# Patient Record
Sex: FEMALE | Race: ASIAN | NOT HISPANIC OR LATINO
[De-identification: names, ages, dates, MRNs, and addresses within clinical notes are randomized per-mention and may not be internally consistent; named-entity substitution may affect disease eponyms.]

---

## 2017-02-17 ENCOUNTER — LABORATORY RESULT (OUTPATIENT)
Age: 32
End: 2017-02-17

## 2017-02-17 ENCOUNTER — APPOINTMENT (OUTPATIENT)
Dept: GASTROENTEROLOGY | Facility: CLINIC | Age: 32
End: 2017-02-17

## 2017-02-17 VITALS
HEIGHT: 65 IN | OXYGEN SATURATION: 96 % | SYSTOLIC BLOOD PRESSURE: 100 MMHG | HEART RATE: 97 BPM | WEIGHT: 98 LBS | TEMPERATURE: 98.2 F | DIASTOLIC BLOOD PRESSURE: 60 MMHG | RESPIRATION RATE: 16 BRPM | BODY MASS INDEX: 16.33 KG/M2

## 2017-02-17 DIAGNOSIS — Z80.0 FAMILY HISTORY OF MALIGNANT NEOPLASM OF DIGESTIVE ORGANS: ICD-10-CM

## 2017-02-21 LAB
25(OH)D3 SERPL-MCNC: 59.3 NG/ML
ALBUMIN SERPL ELPH-MCNC: 4.6 G/DL
ALP BLD-CCNC: 54 U/L
ALT SERPL-CCNC: 16 U/L
ANION GAP SERPL CALC-SCNC: 16 MMOL/L
AST SERPL-CCNC: 26 U/L
BASOPHILS # BLD AUTO: 0.01 K/UL
BASOPHILS NFR BLD AUTO: 0.2 %
BILIRUB SERPL-MCNC: 0.5 MG/DL
BUN SERPL-MCNC: 9 MG/DL
CALCIUM SERPL-MCNC: 9.7 MG/DL
CHLORIDE SERPL-SCNC: 98 MMOL/L
CO2 SERPL-SCNC: 24 MMOL/L
CREAT SERPL-MCNC: 0.94 MG/DL
CRP SERPL-MCNC: <0.2 MG/DL
EOSINOPHIL # BLD AUTO: 0.24 K/UL
EOSINOPHIL NFR BLD AUTO: 4.8 %
GLUCOSE SERPL-MCNC: 84 MG/DL
HCT VFR BLD CALC: 42.2 %
HGB BLD-MCNC: 13.5 G/DL
IGA SER QL IEP: 370 MG/DL
IMM GRANULOCYTES NFR BLD AUTO: 0 %
INR PPP: 1.03 RATIO
LYMPHOCYTES # BLD AUTO: 1.9 K/UL
LYMPHOCYTES NFR BLD AUTO: 37.7 %
MAN DIFF?: NORMAL
MCHC RBC-ENTMCNC: 28.7 PG
MCHC RBC-ENTMCNC: 32 GM/DL
MCV RBC AUTO: 89.6 FL
MONOCYTES # BLD AUTO: 0.32 K/UL
MONOCYTES NFR BLD AUTO: 6.3 %
NEUTROPHILS # BLD AUTO: 2.57 K/UL
NEUTROPHILS NFR BLD AUTO: 51 %
PLATELET # BLD AUTO: 306 K/UL
POTASSIUM SERPL-SCNC: 4.2 MMOL/L
PREALB SERPL NEPH-MCNC: 23 MG/DL
PROT SERPL-MCNC: 8.3 G/DL
PT BLD: 11.6 SEC
RBC # BLD: 4.71 M/UL
RBC # FLD: 13.1 %
SODIUM SERPL-SCNC: 138 MMOL/L
T3FREE SERPL-MCNC: 2.96 PG/ML
T4 FREE SERPL-MCNC: 1.3 NG/DL
TRYPSINOGEN SERPL-MCNC: 452 NG/ML
TSH SERPL-ACNC: 4.35 UIU/ML
TTG IGA SER IA-ACNC: 9.2 UNITS
TTG IGA SER-ACNC: NEGATIVE
VIT A SERPL-MCNC: 43 UG/DL
VIT B12 SERPL-MCNC: 1138 PG/ML
WBC # FLD AUTO: 5.04 K/UL
ZINC SERPL-MCNC: 96 UG/DL

## 2017-02-24 LAB
A-TOCOPHEROL VIT E SERPL-MCNC: 17.8 MG/L
BETA+GAMMA TOCOPHEROL SERPL-MCNC: <1 MG/L

## 2017-03-04 ENCOUNTER — APPOINTMENT (OUTPATIENT)
Dept: ULTRASOUND IMAGING | Facility: IMAGING CENTER | Age: 32
End: 2017-03-04

## 2017-03-07 ENCOUNTER — NON-APPOINTMENT (OUTPATIENT)
Age: 32
End: 2017-03-07

## 2017-03-07 ENCOUNTER — APPOINTMENT (OUTPATIENT)
Dept: INTERNAL MEDICINE | Facility: CLINIC | Age: 32
End: 2017-03-07

## 2017-03-07 VITALS — BODY MASS INDEX: 17.54 KG/M2 | WEIGHT: 99 LBS | HEIGHT: 63 IN

## 2017-03-07 VITALS — HEART RATE: 94 BPM | DIASTOLIC BLOOD PRESSURE: 69 MMHG | SYSTOLIC BLOOD PRESSURE: 101 MMHG

## 2017-03-13 LAB
ALBUMIN SERPL ELPH-MCNC: 4.6 G/DL
ALP BLD-CCNC: 53 U/L
ALT SERPL-CCNC: 16 U/L
ANION GAP SERPL CALC-SCNC: 20 MMOL/L
AST SERPL-CCNC: 25 U/L
BASOPHILS # BLD AUTO: 0.03 K/UL
BASOPHILS NFR BLD AUTO: 0.4 %
BILIRUB SERPL-MCNC: 0.5 MG/DL
BUN SERPL-MCNC: 8 MG/DL
CALCIUM SERPL-MCNC: 10.1 MG/DL
CHLORIDE SERPL-SCNC: 97 MMOL/L
CHOLEST SERPL-MCNC: 179 MG/DL
CHOLEST/HDLC SERPL: 2.9 RATIO
CO2 SERPL-SCNC: 19 MMOL/L
CREAT SERPL-MCNC: 0.75 MG/DL
EOSINOPHIL # BLD AUTO: 0.2 K/UL
EOSINOPHIL NFR BLD AUTO: 3 %
GLUCOSE SERPL-MCNC: 110 MG/DL
HBA1C MFR BLD HPLC: 5.5 %
HCT VFR BLD CALC: 42.2 %
HDLC SERPL-MCNC: 62 MG/DL
HGB BLD-MCNC: 13.5 G/DL
IMM GRANULOCYTES NFR BLD AUTO: 0.1 %
LDLC SERPL CALC-MCNC: 89 MG/DL
LYMPHOCYTES # BLD AUTO: 2.78 K/UL
LYMPHOCYTES NFR BLD AUTO: 41.2 %
MAN DIFF?: NORMAL
MCHC RBC-ENTMCNC: 29 PG
MCHC RBC-ENTMCNC: 32 GM/DL
MCV RBC AUTO: 90.8 FL
MONOCYTES # BLD AUTO: 0.48 K/UL
MONOCYTES NFR BLD AUTO: 7.1 %
NEUTROPHILS # BLD AUTO: 3.24 K/UL
NEUTROPHILS NFR BLD AUTO: 48.2 %
PLATELET # BLD AUTO: 338 K/UL
POTASSIUM SERPL-SCNC: 4.9 MMOL/L
PROT SERPL-MCNC: 7.9 G/DL
RBC # BLD: 4.65 M/UL
RBC # FLD: 13.4 %
SAVE SPECIMEN: NORMAL
SODIUM SERPL-SCNC: 136 MMOL/L
T3FREE SERPL-MCNC: 3.42 PG/ML
T4 SERPL-MCNC: 8.4 UG/DL
THYROGLOB AB SERPL-ACNC: <20 IU/ML
THYROPEROXIDASE AB SERPL IA-ACNC: <10 IU/ML
TRIGL SERPL-MCNC: 138 MG/DL
TSH SERPL-ACNC: 4.62 UIU/ML
WBC # FLD AUTO: 6.74 K/UL

## 2017-12-11 ENCOUNTER — EMERGENCY (EMERGENCY)
Facility: HOSPITAL | Age: 32
LOS: 1 days | Discharge: HOME | End: 2017-12-11
Admitting: EMERGENCY MEDICINE
Payer: SELF-PAY

## 2017-12-11 VITALS
OXYGEN SATURATION: 100 % | SYSTOLIC BLOOD PRESSURE: 139 MMHG | DIASTOLIC BLOOD PRESSURE: 90 MMHG | TEMPERATURE: 98 F | RESPIRATION RATE: 17 BRPM | HEART RATE: 98 BPM

## 2017-12-11 PROCEDURE — 99284 EMERGENCY DEPT VISIT MOD MDM: CPT | Mod: 25

## 2017-12-11 RX ORDER — KETOROLAC TROMETHAMINE 30 MG/ML
15 SYRINGE (ML) INJECTION ONCE
Refills: 0 | Status: DISCONTINUED | OUTPATIENT
Start: 2017-12-11 | End: 2017-12-11

## 2017-12-11 RX ORDER — SODIUM CHLORIDE 9 MG/ML
1000 INJECTION INTRAMUSCULAR; INTRAVENOUS; SUBCUTANEOUS ONCE
Refills: 0 | Status: COMPLETED | OUTPATIENT
Start: 2017-12-11 | End: 2017-12-11

## 2017-12-11 RX ORDER — ONDANSETRON 8 MG/1
4 TABLET, FILM COATED ORAL ONCE
Refills: 0 | Status: COMPLETED | OUTPATIENT
Start: 2017-12-11 | End: 2017-12-11

## 2017-12-11 NOTE — ED PROVIDER NOTE - OBJECTIVE STATEMENT
33 yo F no pmhx here for HA, cough, congestion, body aches x 1 week. pt reports over the past week she has had intermittent HA associated with cough, congestion, and body aches. Reports HA similar in past, took advil two days ago with improvement however HA returned today. States STRONG comes and goes always starting slow and getting worse, all over head, throbbing, never wakes from sleep, never wakes up with headache. Reports mild cough and nasal congestion. Also notes mild nausea in ED. Denies fever chills vomiting diarrhea cp sob weakness numbness tingling dizziness syncope abdominal pain. 33 yo F no pmhx here for HA, cough, congestion, body aches x 1 week. pt reports over the past week she has had intermittent HA associated with cough, congestion, and body aches. Reports HA similar in past, took advil two days ago with improvement however HA returned today. States STRONG comes and goes always starting slow and getting worse, all over head, throbbing, never wakes from sleep, never wakes up with headache. Reports mild cough and nasal congestion. Also notes mild nausea in ED. Denies head trauma or LOC. Denies syncope. Denies fever chills vomiting diarrhea cp sob weakness numbness tingling dizziness syncope abdominal pain.

## 2017-12-11 NOTE — ED ADULT NURSE NOTE - OBJECTIVE STATEMENT
pt received intake 2, alert and orientedx3. c.o cough, congestion, body aches x 1 week with intermittent headaches. denies chest pain dizziness nausea vomiting sob. respirations even unlabored. 20G IV placed in right ac. labs sent. will monitor.

## 2017-12-11 NOTE — ED PROVIDER NOTE - MEDICAL DECISION MAKING DETAILS
31 yo F here for nasal congestion, cough, HA, body aches over past week. Reports HA improved since earlier, mild nausea. Otherwise without complaints. Well appearing VSS, no neuro deficit, no mening mus  Likely viral syndrome. PLAN: basic labs, HA cocktail, ucg, reassess 31 yo F here for nasal congestion, cough, HA, body aches over past week. Reports HA improved since earlier, mild nausea. Otherwise without complaints. Well appearing VSS, no neuro deficit, no mening mus  Likely viral syndrome no e/o or concern for intracranial pathology at this time. PLAN: basic labs, HA cocktail, ucg, reassess

## 2017-12-11 NOTE — ED PROVIDER NOTE - CHPI ED SYMPTOMS NEG
no numbness/no dizziness/no fever/no tingling/no weakness/no chills/no decreased eating/drinking/no vomiting

## 2017-12-11 NOTE — ED PROVIDER NOTE - PHYSICAL EXAMINATION
NEURO: EOMi, PERRLA, visual fields intact, tongue midline, negative romberg, strength 5/5 UE and LE bilaterally, normal gait, facial expressions intact, point to point intact, rapid alternating movements intact, sensate intact to UE and LE bilaterally. NVI

## 2017-12-11 NOTE — ED PROVIDER NOTE - CARE PLAN
Principal Discharge DX:	Headache Principal Discharge DX:	Headache  Instructions for follow-up, activity and diet:	Rest, and drink plenty of fluid. Follow up with PMD in 24-48 hours. To continue the evaluation of your headache, please follow up with a neurologist.  We will give you a list of neurologists (always check with your insurance to make sure the doctor is covered).  -- You can also contact the [facility] ([address] [place] [address], [address] in [place], [place].  Call [phone] for an appointment.    -- Return to the emergency department if you have a headache that seems different or much worse than your usual migraine headache, have a severe headache with a fever or a stiff neck, changes in you speech/vision/balance/movement, feel like you are going to faint, you become confused, or you have a seizure.

## 2017-12-11 NOTE — ED PROVIDER NOTE - PLAN OF CARE
Rest, and drink plenty of fluid. Follow up with PMD in 24-48 hours. To continue the evaluation of your headache, please follow up with a neurologist.  We will give you a list of neurologists (always check with your insurance to make sure the doctor is covered).  -- You can also contact the [facility] ([address] [place] [address], [address] in [place], [place].  Call [phone] for an appointment.    -- Return to the emergency department if you have a headache that seems different or much worse than your usual migraine headache, have a severe headache with a fever or a stiff neck, changes in you speech/vision/balance/movement, feel like you are going to faint, you become confused, or you have a seizure.

## 2017-12-12 LAB
ALBUMIN SERPL ELPH-MCNC: 4.3 G/DL — SIGNIFICANT CHANGE UP (ref 3.3–5)
ALP SERPL-CCNC: 44 U/L — SIGNIFICANT CHANGE UP (ref 40–120)
ALT FLD-CCNC: 13 U/L — SIGNIFICANT CHANGE UP (ref 4–33)
AST SERPL-CCNC: 20 U/L — SIGNIFICANT CHANGE UP (ref 4–32)
BILIRUB SERPL-MCNC: 0.3 MG/DL — SIGNIFICANT CHANGE UP (ref 0.2–1.2)
BUN SERPL-MCNC: 12 MG/DL — SIGNIFICANT CHANGE UP (ref 7–23)
CALCIUM SERPL-MCNC: 8.9 MG/DL — SIGNIFICANT CHANGE UP (ref 8.4–10.5)
CHLORIDE SERPL-SCNC: 104 MMOL/L — SIGNIFICANT CHANGE UP (ref 98–107)
CO2 SERPL-SCNC: 25 MMOL/L — SIGNIFICANT CHANGE UP (ref 22–31)
CREAT SERPL-MCNC: 0.57 MG/DL — SIGNIFICANT CHANGE UP (ref 0.5–1.3)
GLUCOSE SERPL-MCNC: 101 MG/DL — HIGH (ref 70–99)
HCT VFR BLD CALC: 36 % — SIGNIFICANT CHANGE UP (ref 34.5–45)
HGB BLD-MCNC: 12 G/DL — SIGNIFICANT CHANGE UP (ref 11.5–15.5)
MCHC RBC-ENTMCNC: 29.7 PG — SIGNIFICANT CHANGE UP (ref 27–34)
MCHC RBC-ENTMCNC: 33.3 % — SIGNIFICANT CHANGE UP (ref 32–36)
MCV RBC AUTO: 89.1 FL — SIGNIFICANT CHANGE UP (ref 80–100)
NRBC # FLD: 0 — SIGNIFICANT CHANGE UP
PLATELET # BLD AUTO: 231 K/UL — SIGNIFICANT CHANGE UP (ref 150–400)
PMV BLD: 10.1 FL — SIGNIFICANT CHANGE UP (ref 7–13)
POTASSIUM SERPL-MCNC: 4.6 MMOL/L — SIGNIFICANT CHANGE UP (ref 3.5–5.3)
POTASSIUM SERPL-SCNC: 4.6 MMOL/L — SIGNIFICANT CHANGE UP (ref 3.5–5.3)
PROT SERPL-MCNC: 7.3 G/DL — SIGNIFICANT CHANGE UP (ref 6–8.3)
RBC # BLD: 4.04 M/UL — SIGNIFICANT CHANGE UP (ref 3.8–5.2)
RBC # FLD: 13.2 % — SIGNIFICANT CHANGE UP (ref 10.3–14.5)
SODIUM SERPL-SCNC: 141 MMOL/L — SIGNIFICANT CHANGE UP (ref 135–145)
WBC # BLD: 6.99 K/UL — SIGNIFICANT CHANGE UP (ref 3.8–10.5)
WBC # FLD AUTO: 6.99 K/UL — SIGNIFICANT CHANGE UP (ref 3.8–10.5)

## 2017-12-12 RX ADMIN — Medication 15 MILLIGRAM(S): at 00:19

## 2017-12-12 RX ADMIN — ONDANSETRON 4 MILLIGRAM(S): 8 TABLET, FILM COATED ORAL at 00:20

## 2017-12-12 RX ADMIN — SODIUM CHLORIDE 1000 MILLILITER(S): 9 INJECTION INTRAMUSCULAR; INTRAVENOUS; SUBCUTANEOUS at 00:20

## 2018-06-20 ENCOUNTER — EMERGENCY (EMERGENCY)
Facility: HOSPITAL | Age: 33
LOS: 1 days | Discharge: HOME | End: 2018-06-20
Attending: EMERGENCY MEDICINE | Admitting: EMERGENCY MEDICINE
Payer: SELF-PAY

## 2018-06-20 VITALS
OXYGEN SATURATION: 97 % | HEART RATE: 111 BPM | RESPIRATION RATE: 20 BRPM | DIASTOLIC BLOOD PRESSURE: 70 MMHG | TEMPERATURE: 99 F | SYSTOLIC BLOOD PRESSURE: 113 MMHG

## 2018-06-20 LAB
ALBUMIN SERPL ELPH-MCNC: 4.5 G/DL — SIGNIFICANT CHANGE UP (ref 3.3–5)
ALP SERPL-CCNC: 58 U/L — SIGNIFICANT CHANGE UP (ref 40–120)
ALT FLD-CCNC: 13 U/L — SIGNIFICANT CHANGE UP (ref 4–33)
APPEARANCE UR: SIGNIFICANT CHANGE UP
AST SERPL-CCNC: 20 U/L — SIGNIFICANT CHANGE UP (ref 4–32)
BACTERIA # UR AUTO: SIGNIFICANT CHANGE UP
BASOPHILS # BLD AUTO: 0.03 K/UL — SIGNIFICANT CHANGE UP (ref 0–0.2)
BASOPHILS NFR BLD AUTO: 0.3 % — SIGNIFICANT CHANGE UP (ref 0–2)
BILIRUB SERPL-MCNC: 0.3 MG/DL — SIGNIFICANT CHANGE UP (ref 0.2–1.2)
BILIRUB UR-MCNC: NEGATIVE — SIGNIFICANT CHANGE UP
BLOOD UR QL VISUAL: HIGH
BUN SERPL-MCNC: 6 MG/DL — LOW (ref 7–23)
CALCIUM SERPL-MCNC: 9.5 MG/DL — SIGNIFICANT CHANGE UP (ref 8.4–10.5)
CHLORIDE SERPL-SCNC: 99 MMOL/L — SIGNIFICANT CHANGE UP (ref 98–107)
CO2 SERPL-SCNC: 27 MMOL/L — SIGNIFICANT CHANGE UP (ref 22–31)
COLOR SPEC: YELLOW — SIGNIFICANT CHANGE UP
CREAT SERPL-MCNC: 0.7 MG/DL — SIGNIFICANT CHANGE UP (ref 0.5–1.3)
EOSINOPHIL # BLD AUTO: 0.1 K/UL — SIGNIFICANT CHANGE UP (ref 0–0.5)
EOSINOPHIL NFR BLD AUTO: 0.9 % — SIGNIFICANT CHANGE UP (ref 0–6)
GLUCOSE SERPL-MCNC: 97 MG/DL — SIGNIFICANT CHANGE UP (ref 70–99)
GLUCOSE UR-MCNC: NEGATIVE — SIGNIFICANT CHANGE UP
HCT VFR BLD CALC: 39.2 % — SIGNIFICANT CHANGE UP (ref 34.5–45)
HGB BLD-MCNC: 12.4 G/DL — SIGNIFICANT CHANGE UP (ref 11.5–15.5)
IMM GRANULOCYTES # BLD AUTO: 0.03 # — SIGNIFICANT CHANGE UP
IMM GRANULOCYTES NFR BLD AUTO: 0.3 % — SIGNIFICANT CHANGE UP (ref 0–1.5)
KETONES UR-MCNC: NEGATIVE — SIGNIFICANT CHANGE UP
LEUKOCYTE ESTERASE UR-ACNC: HIGH
LYMPHOCYTES # BLD AUTO: 19.3 % — SIGNIFICANT CHANGE UP (ref 13–44)
LYMPHOCYTES # BLD AUTO: 2.15 K/UL — SIGNIFICANT CHANGE UP (ref 1–3.3)
MCHC RBC-ENTMCNC: 28.4 PG — SIGNIFICANT CHANGE UP (ref 27–34)
MCHC RBC-ENTMCNC: 31.6 % — LOW (ref 32–36)
MCV RBC AUTO: 89.9 FL — SIGNIFICANT CHANGE UP (ref 80–100)
MONOCYTES # BLD AUTO: 0.66 K/UL — SIGNIFICANT CHANGE UP (ref 0–0.9)
MONOCYTES NFR BLD AUTO: 5.9 % — SIGNIFICANT CHANGE UP (ref 2–14)
MUCOUS THREADS # UR AUTO: SIGNIFICANT CHANGE UP
NEUTROPHILS # BLD AUTO: 8.16 K/UL — HIGH (ref 1.8–7.4)
NEUTROPHILS NFR BLD AUTO: 73.3 % — SIGNIFICANT CHANGE UP (ref 43–77)
NITRITE UR-MCNC: NEGATIVE — SIGNIFICANT CHANGE UP
NRBC # FLD: 0 — SIGNIFICANT CHANGE UP
PH UR: 7.5 — SIGNIFICANT CHANGE UP (ref 4.6–8)
PLATELET # BLD AUTO: 289 K/UL — SIGNIFICANT CHANGE UP (ref 150–400)
PMV BLD: 9.5 FL — SIGNIFICANT CHANGE UP (ref 7–13)
POTASSIUM SERPL-MCNC: 4.4 MMOL/L — SIGNIFICANT CHANGE UP (ref 3.5–5.3)
POTASSIUM SERPL-SCNC: 4.4 MMOL/L — SIGNIFICANT CHANGE UP (ref 3.5–5.3)
PROT SERPL-MCNC: 7.9 G/DL — SIGNIFICANT CHANGE UP (ref 6–8.3)
PROT UR-MCNC: 150 MG/DL — HIGH
RBC # BLD: 4.36 M/UL — SIGNIFICANT CHANGE UP (ref 3.8–5.2)
RBC # FLD: 13.6 % — SIGNIFICANT CHANGE UP (ref 10.3–14.5)
RBC CASTS # UR COMP ASSIST: >50 — HIGH (ref 0–?)
SODIUM SERPL-SCNC: 138 MMOL/L — SIGNIFICANT CHANGE UP (ref 135–145)
SP GR SPEC: 1.01 — SIGNIFICANT CHANGE UP (ref 1–1.04)
SQUAMOUS # UR AUTO: SIGNIFICANT CHANGE UP
TSH SERPL-MCNC: 5.13 UIU/ML — HIGH (ref 0.27–4.2)
UROBILINOGEN FLD QL: NORMAL MG/DL — SIGNIFICANT CHANGE UP
WBC # BLD: 11.13 K/UL — HIGH (ref 3.8–10.5)
WBC # FLD AUTO: 11.13 K/UL — HIGH (ref 3.8–10.5)
WBC UR QL: >50 — HIGH (ref 0–?)

## 2018-06-20 PROCEDURE — 99284 EMERGENCY DEPT VISIT MOD MDM: CPT

## 2018-06-20 RX ORDER — CEFTRIAXONE 500 MG/1
1 INJECTION, POWDER, FOR SOLUTION INTRAMUSCULAR; INTRAVENOUS EVERY 24 HOURS
Refills: 0 | Status: COMPLETED | OUTPATIENT
Start: 2018-06-20 | End: 2018-06-20

## 2018-06-20 RX ORDER — CEPHALEXIN 500 MG
1 CAPSULE ORAL
Qty: 30 | Refills: 0
Start: 2018-06-20 | End: 2018-06-29

## 2018-06-20 RX ADMIN — CEFTRIAXONE 100 GRAM(S): 500 INJECTION, POWDER, FOR SOLUTION INTRAMUSCULAR; INTRAVENOUS at 19:40

## 2018-06-20 NOTE — ED PROVIDER NOTE - PHYSICAL EXAMINATION
as below  +  skin appears loose over abd  c/w rteported wt loss as below  +  skin appears loose over abd  c/w reported wt loss

## 2018-06-20 NOTE — ED PROVIDER NOTE - MEDICAL DECISION MAKING DETAILS
exam suggests early pyelonephritis vs stone    wt loss  over 2 yrs  ?etiology    screening labs unremarkable  ordered CT to detect possible stone  pt refused did not wish to wait for formal imaging  bedside US  no hydro  pt will take po antibiotcs  encouraged to f/u with PCP

## 2018-06-20 NOTE — ED PROVIDER NOTE - OBJECTIVE STATEMENT
pt presents with one day of acute sxs  Began with painful urination with visible blood  today with franky in rt flank  no vomiting or recorded fever  no resp issues  notes intermittent blood in stools for 1 yr with episodes of both constipation and diarrhea  int epig pain and reflux with certain foods  Notes 100 lb wt loss over 2 yrs which she has attributed to stress but she does not elaborate  Pt has no PMD pt presents with one day of acute sxs  Began with painful urination with visible blood  today with pain rt flank  no vomiting or recorded fever  no resp issues  notes intermittent blood in stools for 1 yr with episodes of both constipation and diarrhea  int epig pain and reflux with certain foods  Notes 100 lb wt loss over 2 yrs which she has attributed to stress but she does not elaborate  Pt has no PMD

## 2018-06-21 LAB
HIV 1+2 AB+HIV1 P24 AG SERPL QL IA: SIGNIFICANT CHANGE UP
SPECIMEN SOURCE: SIGNIFICANT CHANGE UP

## 2018-06-22 LAB
-  AMIKACIN: SIGNIFICANT CHANGE UP
-  AMPICILLIN/SULBACTAM: SIGNIFICANT CHANGE UP
-  AMPICILLIN: SIGNIFICANT CHANGE UP
-  AZTREONAM: SIGNIFICANT CHANGE UP
-  CEFAZOLIN: SIGNIFICANT CHANGE UP
-  CEFEPIME: SIGNIFICANT CHANGE UP
-  CEFOXITIN: SIGNIFICANT CHANGE UP
-  CEFTAZIDIME: SIGNIFICANT CHANGE UP
-  CEFTRIAXONE: SIGNIFICANT CHANGE UP
-  CIPROFLOXACIN: SIGNIFICANT CHANGE UP
-  ERTAPENEM: SIGNIFICANT CHANGE UP
-  GENTAMICIN: SIGNIFICANT CHANGE UP
-  IMIPENEM: SIGNIFICANT CHANGE UP
-  LEVOFLOXACIN: SIGNIFICANT CHANGE UP
-  MEROPENEM: SIGNIFICANT CHANGE UP
-  NITROFURANTOIN: SIGNIFICANT CHANGE UP
-  PIPERACILLIN/TAZOBACTAM: SIGNIFICANT CHANGE UP
-  TIGECYCLINE: SIGNIFICANT CHANGE UP
-  TOBRAMYCIN: SIGNIFICANT CHANGE UP
-  TRIMETHOPRIM/SULFAMETHOXAZOLE: SIGNIFICANT CHANGE UP
BACTERIA UR CULT: SIGNIFICANT CHANGE UP
METHOD TYPE: SIGNIFICANT CHANGE UP
ORGANISM # SPEC MICROSCOPIC CNT: SIGNIFICANT CHANGE UP
ORGANISM # SPEC MICROSCOPIC CNT: SIGNIFICANT CHANGE UP

## 2018-06-23 ENCOUNTER — INPATIENT (INPATIENT)
Facility: HOSPITAL | Age: 33
LOS: 1 days | Discharge: HOME | End: 2018-06-25
Attending: HOSPITALIST | Admitting: HOSPITALIST
Payer: SELF-PAY

## 2018-06-23 VITALS
OXYGEN SATURATION: 100 % | DIASTOLIC BLOOD PRESSURE: 70 MMHG | RESPIRATION RATE: 16 BRPM | TEMPERATURE: 98 F | SYSTOLIC BLOOD PRESSURE: 106 MMHG | HEART RATE: 108 BPM

## 2018-06-23 DIAGNOSIS — G90.9 DISORDER OF THE AUTONOMIC NERVOUS SYSTEM, UNSPECIFIED: ICD-10-CM

## 2018-06-23 DIAGNOSIS — Z87.59 PERSONAL HISTORY OF OTHER COMPLICATIONS OF PREGNANCY, CHILDBIRTH AND THE PUERPERIUM: Chronic | ICD-10-CM

## 2018-06-23 DIAGNOSIS — Z29.9 ENCOUNTER FOR PROPHYLACTIC MEASURES, UNSPECIFIED: ICD-10-CM

## 2018-06-23 DIAGNOSIS — R63.4 ABNORMAL WEIGHT LOSS: ICD-10-CM

## 2018-06-23 DIAGNOSIS — N12 TUBULO-INTERSTITIAL NEPHRITIS, NOT SPECIFIED AS ACUTE OR CHRONIC: ICD-10-CM

## 2018-06-23 LAB
ALBUMIN SERPL ELPH-MCNC: 4.4 G/DL — SIGNIFICANT CHANGE UP (ref 3.3–5)
ALP SERPL-CCNC: 54 U/L — SIGNIFICANT CHANGE UP (ref 40–120)
ALT FLD-CCNC: 11 U/L — SIGNIFICANT CHANGE UP (ref 4–33)
AMORPH CRY # UR COMP ASSIST: SIGNIFICANT CHANGE UP (ref 0–0)
APPEARANCE UR: CLEAR — SIGNIFICANT CHANGE UP
AST SERPL-CCNC: 20 U/L — SIGNIFICANT CHANGE UP (ref 4–32)
BASOPHILS # BLD AUTO: 0.03 K/UL — SIGNIFICANT CHANGE UP (ref 0–0.2)
BASOPHILS NFR BLD AUTO: 0.4 % — SIGNIFICANT CHANGE UP (ref 0–2)
BILIRUB SERPL-MCNC: 0.3 MG/DL — SIGNIFICANT CHANGE UP (ref 0.2–1.2)
BILIRUB UR-MCNC: NEGATIVE — SIGNIFICANT CHANGE UP
BLOOD UR QL VISUAL: NEGATIVE — SIGNIFICANT CHANGE UP
BUN SERPL-MCNC: 7 MG/DL — SIGNIFICANT CHANGE UP (ref 7–23)
CALCIUM SERPL-MCNC: 9.3 MG/DL — SIGNIFICANT CHANGE UP (ref 8.4–10.5)
CHLORIDE SERPL-SCNC: 100 MMOL/L — SIGNIFICANT CHANGE UP (ref 98–107)
CO2 SERPL-SCNC: 26 MMOL/L — SIGNIFICANT CHANGE UP (ref 22–31)
COLOR SPEC: SIGNIFICANT CHANGE UP
CREAT SERPL-MCNC: 0.63 MG/DL — SIGNIFICANT CHANGE UP (ref 0.5–1.3)
EOSINOPHIL # BLD AUTO: 0.1 K/UL — SIGNIFICANT CHANGE UP (ref 0–0.5)
EOSINOPHIL NFR BLD AUTO: 1.5 % — SIGNIFICANT CHANGE UP (ref 0–6)
GLUCOSE SERPL-MCNC: 103 MG/DL — HIGH (ref 70–99)
GLUCOSE UR-MCNC: NEGATIVE — SIGNIFICANT CHANGE UP
HCG SERPL-ACNC: < 5 MIU/ML — SIGNIFICANT CHANGE UP
HCT VFR BLD CALC: 37.3 % — SIGNIFICANT CHANGE UP (ref 34.5–45)
HGB BLD-MCNC: 11.8 G/DL — SIGNIFICANT CHANGE UP (ref 11.5–15.5)
IMM GRANULOCYTES # BLD AUTO: 0.02 # — SIGNIFICANT CHANGE UP
IMM GRANULOCYTES NFR BLD AUTO: 0.3 % — SIGNIFICANT CHANGE UP (ref 0–1.5)
KETONES UR-MCNC: NEGATIVE — SIGNIFICANT CHANGE UP
LEUKOCYTE ESTERASE UR-ACNC: NEGATIVE — SIGNIFICANT CHANGE UP
LYMPHOCYTES # BLD AUTO: 1.52 K/UL — SIGNIFICANT CHANGE UP (ref 1–3.3)
LYMPHOCYTES # BLD AUTO: 22.2 % — SIGNIFICANT CHANGE UP (ref 13–44)
MCHC RBC-ENTMCNC: 28.4 PG — SIGNIFICANT CHANGE UP (ref 27–34)
MCHC RBC-ENTMCNC: 31.6 % — LOW (ref 32–36)
MCV RBC AUTO: 89.9 FL — SIGNIFICANT CHANGE UP (ref 80–100)
MONOCYTES # BLD AUTO: 0.49 K/UL — SIGNIFICANT CHANGE UP (ref 0–0.9)
MONOCYTES NFR BLD AUTO: 7.2 % — SIGNIFICANT CHANGE UP (ref 2–14)
NEUTROPHILS # BLD AUTO: 4.68 K/UL — SIGNIFICANT CHANGE UP (ref 1.8–7.4)
NEUTROPHILS NFR BLD AUTO: 68.4 % — SIGNIFICANT CHANGE UP (ref 43–77)
NITRITE UR-MCNC: NEGATIVE — SIGNIFICANT CHANGE UP
NRBC # FLD: 0 — SIGNIFICANT CHANGE UP
PH UR: 7 — SIGNIFICANT CHANGE UP (ref 4.6–8)
PLATELET # BLD AUTO: 293 K/UL — SIGNIFICANT CHANGE UP (ref 150–400)
PMV BLD: 9.7 FL — SIGNIFICANT CHANGE UP (ref 7–13)
POTASSIUM SERPL-MCNC: 5 MMOL/L — SIGNIFICANT CHANGE UP (ref 3.5–5.3)
POTASSIUM SERPL-SCNC: 5 MMOL/L — SIGNIFICANT CHANGE UP (ref 3.5–5.3)
PROT SERPL-MCNC: 7.7 G/DL — SIGNIFICANT CHANGE UP (ref 6–8.3)
PROT UR-MCNC: NEGATIVE MG/DL — SIGNIFICANT CHANGE UP
RBC # BLD: 4.15 M/UL — SIGNIFICANT CHANGE UP (ref 3.8–5.2)
RBC # FLD: 13.2 % — SIGNIFICANT CHANGE UP (ref 10.3–14.5)
RBC CASTS # UR COMP ASSIST: SIGNIFICANT CHANGE UP (ref 0–?)
SODIUM SERPL-SCNC: 140 MMOL/L — SIGNIFICANT CHANGE UP (ref 135–145)
SP GR SPEC: 1.01 — SIGNIFICANT CHANGE UP (ref 1–1.04)
SQUAMOUS # UR AUTO: SIGNIFICANT CHANGE UP
UROBILINOGEN FLD QL: NORMAL MG/DL — SIGNIFICANT CHANGE UP
WBC # BLD: 6.84 K/UL — SIGNIFICANT CHANGE UP (ref 3.8–10.5)
WBC # FLD AUTO: 6.84 K/UL — SIGNIFICANT CHANGE UP (ref 3.8–10.5)
WBC UR QL: SIGNIFICANT CHANGE UP (ref 0–?)

## 2018-06-23 PROCEDURE — 99222 1ST HOSP IP/OBS MODERATE 55: CPT | Mod: GC

## 2018-06-23 PROCEDURE — 99223 1ST HOSP IP/OBS HIGH 75: CPT | Mod: GC

## 2018-06-23 RX ORDER — ACETAMINOPHEN 500 MG
650 TABLET ORAL EVERY 6 HOURS
Refills: 0 | Status: DISCONTINUED | OUTPATIENT
Start: 2018-06-23 | End: 2018-06-25

## 2018-06-23 RX ORDER — ERTAPENEM SODIUM 1 G/1
1000 INJECTION, POWDER, LYOPHILIZED, FOR SOLUTION INTRAMUSCULAR; INTRAVENOUS EVERY 24 HOURS
Refills: 0 | Status: DISCONTINUED | OUTPATIENT
Start: 2018-06-24 | End: 2018-06-25

## 2018-06-23 RX ORDER — ERTAPENEM SODIUM 1 G/1
1000 INJECTION, POWDER, LYOPHILIZED, FOR SOLUTION INTRAMUSCULAR; INTRAVENOUS ONCE
Refills: 0 | Status: COMPLETED | OUTPATIENT
Start: 2018-06-23 | End: 2018-06-23

## 2018-06-23 RX ORDER — KETOROLAC TROMETHAMINE 30 MG/ML
15 SYRINGE (ML) INJECTION ONCE
Refills: 0 | Status: DISCONTINUED | OUTPATIENT
Start: 2018-06-23 | End: 2018-06-23

## 2018-06-23 RX ORDER — SODIUM CHLORIDE 9 MG/ML
1000 INJECTION INTRAMUSCULAR; INTRAVENOUS; SUBCUTANEOUS ONCE
Refills: 0 | Status: COMPLETED | OUTPATIENT
Start: 2018-06-23 | End: 2018-06-23

## 2018-06-23 RX ORDER — ENOXAPARIN SODIUM 100 MG/ML
40 INJECTION SUBCUTANEOUS DAILY
Refills: 0 | Status: DISCONTINUED | OUTPATIENT
Start: 2018-06-23 | End: 2018-06-25

## 2018-06-23 RX ORDER — PANTOPRAZOLE SODIUM 20 MG/1
40 TABLET, DELAYED RELEASE ORAL
Refills: 0 | Status: DISCONTINUED | OUTPATIENT
Start: 2018-06-23 | End: 2018-06-25

## 2018-06-23 RX ORDER — SODIUM CHLORIDE 9 MG/ML
1000 INJECTION INTRAMUSCULAR; INTRAVENOUS; SUBCUTANEOUS
Refills: 0 | Status: COMPLETED | OUTPATIENT
Start: 2018-06-23 | End: 2018-06-24

## 2018-06-23 RX ADMIN — ERTAPENEM SODIUM 120 MILLIGRAM(S): 1 INJECTION, POWDER, LYOPHILIZED, FOR SOLUTION INTRAMUSCULAR; INTRAVENOUS at 14:53

## 2018-06-23 RX ADMIN — Medication 15 MILLIGRAM(S): at 18:22

## 2018-06-23 RX ADMIN — Medication 15 MILLIGRAM(S): at 14:13

## 2018-06-23 RX ADMIN — SODIUM CHLORIDE 100 MILLILITER(S): 9 INJECTION INTRAMUSCULAR; INTRAVENOUS; SUBCUTANEOUS at 17:08

## 2018-06-23 RX ADMIN — SODIUM CHLORIDE 1000 MILLILITER(S): 9 INJECTION INTRAMUSCULAR; INTRAVENOUS; SUBCUTANEOUS at 14:13

## 2018-06-23 NOTE — CONSULT NOTE ADULT - SUBJECTIVE AND OBJECTIVE BOX
HPI: 32 year old female with no prior PMH who presented to [facility] after being called back for ESBL on urine culture. Of note, patient seen in the ED on 6/20/18 for symptoms of dysuria. U/A at that time with > 50 WBC and RBC. WBC of 11.13. Patient discharged on Keflex. After admission patient noted continued suprapubic cramping and pain as well as new discomfort in the right flank. She also notes an episode of emesis yesterday. She denies any chills or fevers at home. Urine culture resulted with ESBL E. coli which was resistant to fluoroquinolones and bactrim so patient was called back for admission.     In the ED afebrile, normotensive but tachycardic to > 100. Patient started on ertapenem.       PAST MEDICAL & SURGICAL HISTORY:  No pertinent past medical history  No significant past surgical history      Allergies  Milk (Hives)  No Known Drug Allergies  peanuts (Hives)  Tomatoes (Hives)        ANTIMICROBIALS:  ertapenem  IVPB 1000 once      OTHER MEDS: MEDICATIONS  (STANDING):      SOCIAL HISTORY:  [ ] etoh [ ] tobacco [ ] former smoker [ ] IVDU    FAMILY HISTORY:  No pertinent family history in first degree relatives      REVIEW OF SYSTEMS  [  ] ROS unobtainable because:    [  ] All other systems negative except as noted below:	    Constitutional:  [ ] fever [ ] chills  [ ] weight loss  [ ] weakness  Skin:  [ ] rash [ ] phlebitis	  Eyes: [ ] icterus [ ] pain  [ ] discharge	  ENMT: [ ] sore throat  [ ] thrush [ ] ulcers [ ] exudates  Respiratory: [ ] dyspnea [ ] hemoptysis [ ] cough [ ] sputum	  Cardiovascular:  [ ] chest pain [ ] palpitations [ ] edema	  Gastrointestinal:  [ ] nausea [ ] vomiting [ ] diarrhea [ ] constipation [ ] pain	  Genitourinary:  [ ] dysuria [ ] frequency [ ] hematuria [ ] discharge [ ] flank pain  [ ] incontinence  Musculoskeletal:  [ ] myalgias [ ] arthralgias [ ] arthritis  [ ] back pain  Neurological:  [ ] headache [ ] seizures  [ ] confusion/altered mental status  Psychiatric:  [ ] anxiety [ ] depression	  Hematology/Lymphatics:  [ ] lymphadenopathy  Endocrine:  [ ] adrenal [ ] thyroid  Allergic/Immunologic:	 [ ] transplant [ ] seasonal    Vital Signs Last 24 Hrs  T(F): 98.4 (06-23-18 @ 12:25), Max: 99.1 (06-20-18 @ 17:27)    Vital Signs Last 24 Hrs  HR: 108 (06-23-18 @ 12:25) (108 - 108)  BP: 106/70 (06-23-18 @ 12:25) (106/70 - 106/70)  RR: 16 (06-23-18 @ 12:25)  SpO2: 100% (06-23-18 @ 12:25) (100% - 100%)  Wt(kg): --    PHYSICAL EXAM:  General: non-toxic  HEAD/EYES: anicteric, PERRL  ENT:  supple  Cardiovascular:   S1, S2  Respiratory:  clear bilaterally  GI:  soft, non-tender, normal bowel sounds  :  no CVA tenderness   Musculoskeletal:  no synovitis  Neurologic:  grossly non-focal  Skin:  no rash  Lymph: no lymphadenopathy  Psychiatric:  appropriate affect  Vascular:  no phlebitis      MICROBIOLOGY:  URINE MIDSTREAM  06-20-18 --  --  E.COLI ESBL POSITIVE    RADIOLOGY:  No imaging for review at the time of consult HPI: 32 year old female with no prior PMH who presented to [facility] after being called back for ESBL on urine culture. Of note, patient seen in the ED on 6/20/18 for symptoms of dysuria. U/A at that time with > 50 WBC and RBC. WBC of 11.13. Patient discharged on Keflex. After admission patient noted continued suprapubic cramping and pain as well as new discomfort in the right flank. She also notes an episode of emesis yesterday. She denies any chills or fevers at home. Urine culture resulted with ESBL E. coli which was resistant to fluoroquinolones and bactrim so patient was called back for admission.     In the ED afebrile, normotensive but tachycardic to > 100. Patient started on ertapenem.       PAST MEDICAL & SURGICAL HISTORY:  No pertinent past medical history  No significant past surgical history      Allergies  Milk (Hives)  No Known Drug Allergies  peanuts (Hives)  Tomatoes (Hives)        ANTIMICROBIALS:  ertapenem  IVPB 1000 once      OTHER MEDS: MEDICATIONS  (STANDING):      SOCIAL HISTORY:  denies smoking or EtOH use. [place]oved [place] from [place] > 10 years ago x    FAMILY HISTORY:  No pertinent family history in first degree relatives      REVIEW OF SYSTEMS  [  ] ROS unobtainable because:    [ x ] All other systems negative except as noted below:	    Constitutional:  [ ] fever [x ] chills  [ ] weight loss  [ ] weakness  Skin:  [ ] rash [ ] phlebitis	  Eyes: [ ] icterus [ ] pain  [ ] discharge	  ENMT: [ ] sore throat  [ ] thrush [ ] ulcers [ ] exudates  Respiratory: [ ] dyspnea [ ] hemoptysis [ ] cough [ ] sputum	  Cardiovascular:  [ ] chest pain [ ] palpitations [ ] edema	  Gastrointestinal:  [ x] nausea [x ] vomiting [ ] diarrhea [ ] constipation [ ] pain	  Genitourinary:  [ ] dysuria [ ] frequency [ ] hematuria [ ] discharge [x ] flank pain  [ ] incontinence  Musculoskeletal:  [ ] myalgias [ ] arthralgias [ ] arthritis  [ ] back pain  Neurological:  [ ] headache [ ] seizures  [ ] confusion/altered mental status  Psychiatric:  [ ] anxiety [ ] depression	  Hematology/Lymphatics:  [ ] lymphadenopathy  Endocrine:  [ ] adrenal [ ] thyroid  Allergic/Immunologic:	 [ ] transplant [ ] seasonal    Vital Signs Last 24 Hrs  T(F): 98.4 (06-23-18 @ 12:25), Max: 99.1 (06-20-18 @ 17:27)    Vital Signs Last 24 Hrs  HR: 108 (06-23-18 @ 12:25) (108 - 108)  BP: 106/70 (06-23-18 @ 12:25) (106/70 - 106/70)  RR: 16 (06-23-18 @ 12:25)  SpO2: 100% (06-23-18 @ 12:25) (100% - 100%)  Wt(kg): --    PHYSICAL EXAMINATION:  General: Alert and Awake, NAD  HEENT: PERRL, EOMI, No subconjunctival hemorrhages, Oropharynx Clear, MMM  Neck: Supple, No ELVER  Cardiac: RRR, No M/R/G  Resp: CTAB, No Wh/Rh/Ra  Abdomen: NBS, ND, + Suprapubic pain, No HSM, No rigidity or guarding  MSK: No LE edema. No stigmata of IE. No evidence of phlebitis. No evidence of synovitis.  Back: No CVA Tenderness  : No otero  Skin: No rashes or lesions. Skin is warm and dry to the touch.   Neuro: Alert and Awake. CN 2-12 Grossly intact. Moves all four extremities spontaneously.  Psych: Calm, Pleasant, Cooperative    MICROBIOLOGY:  URINE MIDSTREAM  06-20-18 --  --  E.COLI ESBL POSITIVE    RADIOLOGY:  No imaging for review at the time of consult HPI: 32 year old female with dysautonomia who presented to [facility] after being called back for ESBL on urine culture. Of note, patient seen in the ED on 6/20/18 for symptoms of dysuria. U/A at that time with > 50 WBC and RBC. WBC of 11.13. Patient discharged on Keflex. After admission patient noted continued suprapubic cramping and pain as well as new discomfort in the right flank. She also notes an episode of emesis yesterday. She denies any chills or fevers at home. Urine culture resulted with ESBL E. coli which was resistant to fluoroquinolones and bactrim so patient was called back for admission.     In the ED afebrile, normotensive but tachycardic to > 100. Patient started on ertapenem.   No previous UTI      PAST MEDICAL & SURGICAL HISTORY:  Dysautonomia  No significant past surgical history      Allergies  Milk (Hives)  No Known Drug Allergies  peanuts (Hives)  Tomatoes (Hives)        ANTIMICROBIALS:  ertapenem  IVPB 1000 once      OTHER MEDS: MEDICATIONS  (STANDING):      SOCIAL HISTORY:  denies smoking or EtOH use. [place] from [place] > 10 years ago x    FAMILY HISTORY:  No pertinent family history in first degree relatives      REVIEW OF SYSTEMS  [  ] ROS unobtainable because:    [ x ] All other systems negative except as noted below:	    Constitutional:  [ ] fever [x ] chills  [ ] weight loss  [ ] weakness  Skin:  [ ] rash [ ] phlebitis	  Eyes: [ ] icterus [ ] pain  [ ] discharge	  ENMT: [ ] sore throat  [ ] thrush [ ] ulcers [ ] exudates  Respiratory: [ ] dyspnea [ ] hemoptysis [ ] cough [ ] sputum	  Cardiovascular:  [ ] chest pain [ ] palpitations [ ] edema	  Gastrointestinal:  [ x] nausea [x ] vomiting [ ] diarrhea [ ] constipation [ ] pain	  Genitourinary:  [ ] dysuria [ ] frequency [ ] hematuria [ ] discharge [x ] flank pain  [ ] incontinence  Musculoskeletal:  [ ] myalgias [ ] arthralgias [ ] arthritis  [ ] back pain  Neurological:  [ ] headache [ ] seizures  [ ] confusion/altered mental status  Psychiatric:  [ ] anxiety [ ] depression	  Hematology/Lymphatics:  [ ] lymphadenopathy  Endocrine:  [ ] adrenal [ ] thyroid  Allergic/Immunologic:	 [ ] transplant [ ] seasonal    Vital Signs Last 24 Hrs  T(F): 98.4 (06-23-18 @ 12:25), Max: 99.1 (06-20-18 @ 17:27)    Vital Signs Last 24 Hrs  HR: 108 (06-23-18 @ 12:25) (108 - 108)  BP: 106/70 (06-23-18 @ 12:25) (106/70 - 106/70)  RR: 16 (06-23-18 @ 12:25)  SpO2: 100% (06-23-18 @ 12:25) (100% - 100%)  Wt(kg): --    PHYSICAL EXAMINATION:  General: Alert and Awake, NAD  HEENT: PERRL, EOMI, No subconjunctival hemorrhages, Oropharynx Clear, MMM  Neck: Supple, No ELVER  Cardiac: RRR, No M/R/G  Resp: CTAB, No Wh/Rh/Ra  Abdomen: NBS, ND, + Suprapubic pain, No HSM, No rigidity or guarding  MSK: No LE edema. No stigmata of IE. No evidence of phlebitis. No evidence of synovitis.  Back: No CVA Tenderness  : No otero  Skin: No rashes or lesions. Skin is warm and dry to the touch.   Neuro: Alert and Awake. CN 2-12 Grossly intact. Moves all four extremities spontaneously.  Psych: Calm, Pleasant, Cooperative    MICROBIOLOGY:  URINE MIDSTREAM  06-20-18 --  --  E.COLI ESBL POSITIVE    RADIOLOGY:  No imaging for review at the time of consult

## 2018-06-23 NOTE — ED PROVIDER NOTE - MEDICAL DECISION MAKING DETAILS
32F with ESBL Ecoli UTI, called back for change in abx coverage with worsening sx. check labs, ivf hydration. ID eval.

## 2018-06-23 NOTE — H&P ADULT - NSHPREVIEWOFSYSTEMS_GEN_ALL_CORE
Constitutional: + chills, +weight loss  HEENT: denies visual changes, hearing changes, rhinitis, odynophagia, or dysphagia  Cardiovascular: denies palpitations, chest pain, edema  Respiratory: denies SOB, wheezing  Gastrointestinal: denies N/V/D, abdominal pain, hematochezia, melena  : denies dysuria, hematuria  MSK: denies weakness, joint pain  Skin: denies new rashes or masses  Psych: denies hallucinations, anxiety, depression  Neuro: denies tremors, sensory or motor deficits

## 2018-06-23 NOTE — CONSULT NOTE ADULT - ATTENDING COMMENTS
late this afternoon, her clinical appearance is excellent and labs have normalized  she states flank pain has resolved    suggest:  Renal sonogram  Continue Ertapenem    if remains stable complete antibiotics 6/25

## 2018-06-23 NOTE — H&P ADULT - ATTENDING COMMENTS
31 yo F with hx of dysautonomia (tachycardia) diagnosed in 2016 at [facility] now presenting for a call back on her urine culture which grew ESBL.    Pt was seen in the ED on 6/20 for symptoms of painful urination, blood in her urine, back pain and pelvic pressure, she was discharged on 10 days of cephalexin. Today, pt continues to have pelvic pressure, but no dysuria or hematuria.  Pt states she stopped using lopressor at home, her HR has been controlled with behavioral modifications. Pt endorses neck lymphadenopathy that's been present for 1-2 years. she has not be seen by a doctor about it.   Physical exam notable for R CVA tenderness.     Pyelonephritis: clinically diagnosed, likely due to ESBL UTI infection, c/w ertapenem likely for 3 days, f/u ID recs. Contact Isolation prn pain control  Lymphadenopathy: chronic, will defer workup outpt given its chronicity. heme and PCP follow up outpt. Pt aware  Tachycardia: Lopressor prn

## 2018-06-23 NOTE — H&P ADULT - NSHPPHYSICALEXAM_GEN_ALL_CORE
PHYSICAL EXAM:  GENERAL: +chills  HEAD: Atraumatic, Normocephalic  EYES: EOMI, PERRLA, conjunctiva and sclera clear  NECK: Supple, No JVD  CHEST/LUNG: Clear to auscultation bilaterally; No wheezes/rales/rhonchi  HEART: Regular rate and rhythm; No murmurs, rubs, or gallops  ABDOMEN: Soft, Nontender, Nondistended; Bowel sounds present +CVA tenderness bilaterally  EXTREMITIES:  2+ dP pulses b/l, No clubbing, cyanosis, or edema  PSYCH: reactive affect  NEUROLOGY: AAOx3, non-focal  SKIN: No rashes or lesions  Lymph: +<1cm LN in posterior R neck, <1cm LN in L post-auricular area, <1cm LN in L pelvic area

## 2018-06-23 NOTE — ED PROVIDER NOTE - ATTENDING CONTRIBUTION TO CARE
32F diagnosed with UTI 2d ago, discharged home on Keflex, culture results today grew back ESBL.  Patient reports continued symptoms with suprapubic pain and flank pain, no fever. On exam well appearing, breathing comfortably, mild suprapubic ttp, no CVA tenderness. Spoke with ID who recommends IV abx in case of early pyelo. Will admit to medicine.

## 2018-06-23 NOTE — ED ADULT NURSE NOTE - OBJECTIVE STATEMENT
received pt A&Ox3 in no apparent distress at this time. bloods drawn and sent to the lab. no s/s of infiltration noted at this time. family and MD at bedside. pt on contact precaution as per MD order. pt admitted to MED, transferred to ESSU 2 with no complications.

## 2018-06-23 NOTE — ED PROVIDER NOTE - OBJECTIVE STATEMENT
32F called back for urine culture with ESBL Ecoli uti. was seen in the ED 3 days ago for dysuria, dc'd home on keflex. Patient states she has been having continued suprapubic cramping and pain as well as new discomfort on the right flank area. (+) episode of vomiting yesterday. no back pain, fever.

## 2018-06-23 NOTE — ED ADULT NURSE REASSESSMENT NOTE - NS ED NURSE REASSESS COMMENT FT1
pt in bed resting .nad. vss. denies any pain . will continue to monitor.
RN Break Coverage: Alert and oriented x 4.  Pt received from RN in no distress. Will continue to monitor. Contact precautions maintained.

## 2018-06-23 NOTE — H&P ADULT - HISTORY OF PRESENT ILLNESS
33 yo F with hx of dysautonomia (tachycardia) diagnosed in 2016 at [facility] now presenting for a call back on her urine culture which grew ESBL.    Pt was seen in the ED on 6/20 for symptoms of painful uriantion, blood in her urine and pelvic pressure, she was discharged on 10 days of cephalexin. Today, pt continues to have pelvic pressure, but no dysuria or hematuria. Pt is visibly rigoring at this time.    Separately patient states she has had ~100 lb weight loss over the last 1 year which has been unintentional. She has increased calorie intake and used supplements but "slowly loses more weight each day." She has had night chills and has noticed "bumps under my skin that get worse sometimes." Pt has 3 subcentimeter LN located L pelvic area, L periauricular, and R posterior neck.

## 2018-06-23 NOTE — H&P ADULT - NSHPLABSRESULTS_GEN_ALL_CORE
06-23    140  |  100  |  7   ----------------------------<  103<H>  5.0   |  26  |  0.63    Ca    9.3      23 Jun 2018 14:45    TPro  7.7  /  Alb  4.4  /  TBili  0.3  /  DBili  x   /  AST  20  /  ALT  11  /  AlkPhos  54  06-23                          11.8   6.84  )-----------( 293      ( 23 Jun 2018 14:45 )             37.3     LIVER FUNCTIONS - ( 23 Jun 2018 14:45 )  Alb: 4.4 g/dL / Pro: 7.7 g/dL / ALK PHOS: 54 u/L / ALT: 11 u/L / AST: 20 u/L / GGT: x

## 2018-06-23 NOTE — ED POST DISCHARGE NOTE - DETAILS
Spoke with patient regarding urine culture results and resistance to most antibiotics. Pt. was dc on keflex which is resistant. Pt. states still experiencing abdominal pain and urinary symptoms. Advised to return to ED for further evaluation and antibiotic treatment. Pt. understands and agrees.

## 2018-06-23 NOTE — H&P ADULT - PROBLEM SELECTOR PLAN 1
- ESBL on urine culture  - ertapenem as per ID consult  - f/u blood cultures (which were drawn post abx infusion in ED today)

## 2018-06-23 NOTE — ED PROVIDER NOTE - PROGRESS NOTE DETAILS
Theron: Spoke with infectious disease fellow- recommending IV ertapenem. will follow during admission.

## 2018-06-23 NOTE — H&P ADULT - ASSESSMENT
33 yo F with hx of dysautonomia (tachycardia) diagnosed in 2016 at [facility] now presenting for a call back on her urine culture which grew ESBL.

## 2018-06-23 NOTE — ED ADULT TRIAGE NOTE - CHIEF COMPLAINT QUOTE
states called back.  positive urine culture. seen thurs uti,keflex prescribed.  reports  denies discomfort upon urinating.  reports suprapubic discomfort

## 2018-06-23 NOTE — CONSULT NOTE ADULT - ASSESSMENT
32 year old female with no prior PMH who presented to [facility] after being called back for ESBL on urine culture. Of note, patient seen in the ED on 6/20/18 for symptoms of dysuria. U/A at that time with > 50 WBC and RBC. WBC of 11.13. Patient discharged on Keflex with improvement in dysuria but with new flank pain and nausea/vomiting. In the ED afebrile, normotensive but tachycardic to > 100. Patient started on ertapenem. Now with U/A without pyuria and normalized WBC count.     Overall, ESBL UTI/Pyelonephritis. While her WBC count normalized and U/A is without pyuria given her flank pain would treat with Ertapenem for a few days of inpatient. Do not anticipate IV antibiotics on discharge.    --Continue Ertapenem 1g IV Q24H  --Recommend Renal US  --F/U Blood Cultures

## 2018-06-23 NOTE — H&P ADULT - PROBLEM SELECTOR PLAN 3
- heart rate controlled, BP normal at this time  - if needed, will use lopressor as pt was responsive to it in 2016 when this was diagnosed

## 2018-06-23 NOTE — H&P ADULT - PROBLEM SELECTOR PLAN 2
- may be 2/2 dysautonomia, GI work up previsously negative exept for gastritis  - no leukocytosis   - pt does have increased bruising  - may consider a heme w/u  - encourage PO intake - may be 2/2 dysautonomia, GI work up previously negative except for gastritis  - no leukocytosis   - pt does have increased bruising  - may consider a heme w/u outpt  - encourage PO intake

## 2018-06-24 LAB
ALBUMIN SERPL ELPH-MCNC: 3.6 G/DL — SIGNIFICANT CHANGE UP (ref 3.3–5)
ALP SERPL-CCNC: 42 U/L — SIGNIFICANT CHANGE UP (ref 40–120)
ALT FLD-CCNC: 9 U/L — SIGNIFICANT CHANGE UP (ref 4–33)
AST SERPL-CCNC: 15 U/L — SIGNIFICANT CHANGE UP (ref 4–32)
BASOPHILS # BLD AUTO: 0.05 K/UL — SIGNIFICANT CHANGE UP (ref 0–0.2)
BASOPHILS NFR BLD AUTO: 1 % — SIGNIFICANT CHANGE UP (ref 0–2)
BILIRUB SERPL-MCNC: 0.2 MG/DL — SIGNIFICANT CHANGE UP (ref 0.2–1.2)
BUN SERPL-MCNC: 6 MG/DL — LOW (ref 7–23)
CALCIUM SERPL-MCNC: 8.2 MG/DL — LOW (ref 8.4–10.5)
CHLORIDE SERPL-SCNC: 109 MMOL/L — HIGH (ref 98–107)
CO2 SERPL-SCNC: 24 MMOL/L — SIGNIFICANT CHANGE UP (ref 22–31)
CREAT SERPL-MCNC: 0.63 MG/DL — SIGNIFICANT CHANGE UP (ref 0.5–1.3)
EOSINOPHIL # BLD AUTO: 0.18 K/UL — SIGNIFICANT CHANGE UP (ref 0–0.5)
EOSINOPHIL NFR BLD AUTO: 3.5 % — SIGNIFICANT CHANGE UP (ref 0–6)
GLUCOSE SERPL-MCNC: 77 MG/DL — SIGNIFICANT CHANGE UP (ref 70–99)
HCT VFR BLD CALC: 32 % — LOW (ref 34.5–45)
HGB BLD-MCNC: 10.1 G/DL — LOW (ref 11.5–15.5)
IMM GRANULOCYTES # BLD AUTO: 0.01 # — SIGNIFICANT CHANGE UP
IMM GRANULOCYTES NFR BLD AUTO: 0.2 % — SIGNIFICANT CHANGE UP (ref 0–1.5)
LYMPHOCYTES # BLD AUTO: 2.41 K/UL — SIGNIFICANT CHANGE UP (ref 1–3.3)
LYMPHOCYTES # BLD AUTO: 46.7 % — HIGH (ref 13–44)
MAGNESIUM SERPL-MCNC: 2 MG/DL — SIGNIFICANT CHANGE UP (ref 1.6–2.6)
MCHC RBC-ENTMCNC: 28.8 PG — SIGNIFICANT CHANGE UP (ref 27–34)
MCHC RBC-ENTMCNC: 31.6 % — LOW (ref 32–36)
MCV RBC AUTO: 91.2 FL — SIGNIFICANT CHANGE UP (ref 80–100)
MONOCYTES # BLD AUTO: 0.44 K/UL — SIGNIFICANT CHANGE UP (ref 0–0.9)
MONOCYTES NFR BLD AUTO: 8.5 % — SIGNIFICANT CHANGE UP (ref 2–14)
NEUTROPHILS # BLD AUTO: 2.07 K/UL — SIGNIFICANT CHANGE UP (ref 1.8–7.4)
NEUTROPHILS NFR BLD AUTO: 40.1 % — LOW (ref 43–77)
NRBC # FLD: 0 — SIGNIFICANT CHANGE UP
PHOSPHATE SERPL-MCNC: 2.7 MG/DL — SIGNIFICANT CHANGE UP (ref 2.5–4.5)
PLATELET # BLD AUTO: 249 K/UL — SIGNIFICANT CHANGE UP (ref 150–400)
PMV BLD: 10.3 FL — SIGNIFICANT CHANGE UP (ref 7–13)
POTASSIUM SERPL-MCNC: 4.1 MMOL/L — SIGNIFICANT CHANGE UP (ref 3.5–5.3)
POTASSIUM SERPL-SCNC: 4.1 MMOL/L — SIGNIFICANT CHANGE UP (ref 3.5–5.3)
PROT SERPL-MCNC: 6.2 G/DL — SIGNIFICANT CHANGE UP (ref 6–8.3)
RBC # BLD: 3.51 M/UL — LOW (ref 3.8–5.2)
RBC # FLD: 13.3 % — SIGNIFICANT CHANGE UP (ref 10.3–14.5)
SODIUM SERPL-SCNC: 143 MMOL/L — SIGNIFICANT CHANGE UP (ref 135–145)
SPECIMEN SOURCE: SIGNIFICANT CHANGE UP
SPECIMEN SOURCE: SIGNIFICANT CHANGE UP
WBC # BLD: 5.16 K/UL — SIGNIFICANT CHANGE UP (ref 3.8–10.5)
WBC # FLD AUTO: 5.16 K/UL — SIGNIFICANT CHANGE UP (ref 3.8–10.5)

## 2018-06-24 PROCEDURE — 99233 SBSQ HOSP IP/OBS HIGH 50: CPT

## 2018-06-24 RX ADMIN — SODIUM CHLORIDE 100 MILLILITER(S): 9 INJECTION INTRAMUSCULAR; INTRAVENOUS; SUBCUTANEOUS at 05:41

## 2018-06-24 RX ADMIN — ERTAPENEM SODIUM 120 MILLIGRAM(S): 1 INJECTION, POWDER, LYOPHILIZED, FOR SOLUTION INTRAMUSCULAR; INTRAVENOUS at 17:18

## 2018-06-24 RX ADMIN — ENOXAPARIN SODIUM 40 MILLIGRAM(S): 100 INJECTION SUBCUTANEOUS at 12:21

## 2018-06-24 RX ADMIN — PANTOPRAZOLE SODIUM 40 MILLIGRAM(S): 20 TABLET, DELAYED RELEASE ORAL at 09:52

## 2018-06-24 NOTE — PROGRESS NOTE ADULT - ASSESSMENT
31 yo F with hx of dysautonomia (tachycardia) diagnosed in 2016 at [facility] now presenting for a call back on her urine culture which grew ESBL.    1. Pyelonephritis.     ESBL on urine culture  - ertapenem as per ID consult  f/u with ID     2. Weight loss, non-intentional.     may be 2/2 dysautonomia, GI work up previously negative except for gastritis  - no leukocytosis   - pt does have increased bruising  - may consider a heme w/u outpt  - encourage PO intake.    3. Dysautonomia.    heart rate controlled, BP normal at this time  -    GI and DVT Prophylaxis

## 2018-06-24 NOTE — PROGRESS NOTE ADULT - SUBJECTIVE AND OBJECTIVE BOX
31 yo F with hx of dysautonomia (tachycardia) diagnosed in 2016 at [facility] now presenting for a call back on her urine culture which grew ESBL.  patient seen and examine at bed side  no acute issue     MEDICATIONS  (STANDING):  enoxaparin Injectable 40 milliGRAM(s) SubCutaneous daily  ertapenem  IVPB 1000 milliGRAM(s) IV Intermittent every 24 hours  pantoprazole    Tablet 40 milliGRAM(s) Oral before breakfast    MEDICATIONS  (PRN):  acetaminophen   Tablet 650 milliGRAM(s) Oral every 6 hours PRN For Temp greater than 38 C (100.4 F)  acetaminophen   Tablet. 650 milliGRAM(s) Oral every 6 hours PRN Mild Pain (1 - 3)      Vital Signs Last 24 Hrs  T(C): 36.4 (24 Jun 2018 11:21), Max: 36.8 (24 Jun 2018 00:16)  T(F): 97.6 (24 Jun 2018 11:21), Max: 98.3 (24 Jun 2018 00:16)  HR: 97 (24 Jun 2018 11:21) (73 - 97)  BP: 105/63 (24 Jun 2018 11:21) (96/58 - 105/63)  BP(mean): --  RR: 18 (24 Jun 2018 11:21) (17 - 18)  SpO2: 100% (24 Jun 2018 11:21) (96% - 100%)      Physical Exam: GENERAL: NAD, well-developed  	HEAD:  Atraumatic, Normocephalic  	EYES: EOMI, PERRLA, conjunctiva and sclera clear  	NECK: Supple, No JVD  	CHEST/LUNG: Clear to auscultation bilaterally; No wheeze  	HEART: Regular rate and rhythm; No murmurs, rubs, or gallops  	ABDOMEN: Soft, Nontender, Nondistended; Bowel sounds present  	EXTREMITIES:  2+ Peripheral Pulses, No clubbing, cyanosis, or edema  	PSYCH: AAOx3  	NEUROLOGY: non-focal                            10.1   5.16  )-----------( 249      ( 24 Jun 2018 06:15 )             32.0       06-24    143  |  109<H>  |  6<L>  ----------------------------<  77  4.1   |  24  |  0.63    Ca    8.2<L>      24 Jun 2018 06:15  Phos  2.7     06-24  Mg     2.0     06-24    TPro  6.2  /  Alb  3.6  /  TBili  0.2  /  DBili  x   /  AST  15  /  ALT  9   /  AlkPhos  42  06-24

## 2018-06-25 ENCOUNTER — TRANSCRIPTION ENCOUNTER (OUTPATIENT)
Age: 33
End: 2018-06-25

## 2018-06-25 VITALS
SYSTOLIC BLOOD PRESSURE: 98 MMHG | DIASTOLIC BLOOD PRESSURE: 59 MMHG | OXYGEN SATURATION: 100 % | HEART RATE: 70 BPM | RESPIRATION RATE: 18 BRPM

## 2018-06-25 LAB
ALBUMIN SERPL ELPH-MCNC: 4 G/DL — SIGNIFICANT CHANGE UP (ref 3.3–5)
ALP SERPL-CCNC: 46 U/L — SIGNIFICANT CHANGE UP (ref 40–120)
ALT FLD-CCNC: 8 U/L — SIGNIFICANT CHANGE UP (ref 4–33)
AST SERPL-CCNC: 15 U/L — SIGNIFICANT CHANGE UP (ref 4–32)
BACTERIA UR CULT: SIGNIFICANT CHANGE UP
BASOPHILS # BLD AUTO: 0.02 K/UL — SIGNIFICANT CHANGE UP (ref 0–0.2)
BASOPHILS NFR BLD AUTO: 0.4 % — SIGNIFICANT CHANGE UP (ref 0–2)
BILIRUB SERPL-MCNC: 0.3 MG/DL — SIGNIFICANT CHANGE UP (ref 0.2–1.2)
BUN SERPL-MCNC: 4 MG/DL — LOW (ref 7–23)
CALCIUM SERPL-MCNC: 8.7 MG/DL — SIGNIFICANT CHANGE UP (ref 8.4–10.5)
CHLORIDE SERPL-SCNC: 105 MMOL/L — SIGNIFICANT CHANGE UP (ref 98–107)
CO2 SERPL-SCNC: 24 MMOL/L — SIGNIFICANT CHANGE UP (ref 22–31)
CREAT SERPL-MCNC: 0.58 MG/DL — SIGNIFICANT CHANGE UP (ref 0.5–1.3)
EOSINOPHIL # BLD AUTO: 0.2 K/UL — SIGNIFICANT CHANGE UP (ref 0–0.5)
EOSINOPHIL NFR BLD AUTO: 4.2 % — SIGNIFICANT CHANGE UP (ref 0–6)
GLUCOSE SERPL-MCNC: 75 MG/DL — SIGNIFICANT CHANGE UP (ref 70–99)
HCT VFR BLD CALC: 35.5 % — SIGNIFICANT CHANGE UP (ref 34.5–45)
HGB BLD-MCNC: 11.2 G/DL — LOW (ref 11.5–15.5)
IMM GRANULOCYTES # BLD AUTO: 0.01 # — SIGNIFICANT CHANGE UP
IMM GRANULOCYTES NFR BLD AUTO: 0.2 % — SIGNIFICANT CHANGE UP (ref 0–1.5)
LYMPHOCYTES # BLD AUTO: 2.5 K/UL — SIGNIFICANT CHANGE UP (ref 1–3.3)
LYMPHOCYTES # BLD AUTO: 52.6 % — HIGH (ref 13–44)
MAGNESIUM SERPL-MCNC: 2.2 MG/DL — SIGNIFICANT CHANGE UP (ref 1.6–2.6)
MCHC RBC-ENTMCNC: 28.5 PG — SIGNIFICANT CHANGE UP (ref 27–34)
MCHC RBC-ENTMCNC: 31.5 % — LOW (ref 32–36)
MCV RBC AUTO: 90.3 FL — SIGNIFICANT CHANGE UP (ref 80–100)
MONOCYTES # BLD AUTO: 0.34 K/UL — SIGNIFICANT CHANGE UP (ref 0–0.9)
MONOCYTES NFR BLD AUTO: 7.2 % — SIGNIFICANT CHANGE UP (ref 2–14)
NEUTROPHILS # BLD AUTO: 1.68 K/UL — LOW (ref 1.8–7.4)
NEUTROPHILS NFR BLD AUTO: 35.4 % — LOW (ref 43–77)
NRBC # FLD: 0 — SIGNIFICANT CHANGE UP
PHOSPHATE SERPL-MCNC: 2.4 MG/DL — LOW (ref 2.5–4.5)
PLATELET # BLD AUTO: 257 K/UL — SIGNIFICANT CHANGE UP (ref 150–400)
PMV BLD: 10 FL — SIGNIFICANT CHANGE UP (ref 7–13)
POTASSIUM SERPL-MCNC: 4.2 MMOL/L — SIGNIFICANT CHANGE UP (ref 3.5–5.3)
POTASSIUM SERPL-SCNC: 4.2 MMOL/L — SIGNIFICANT CHANGE UP (ref 3.5–5.3)
PROT SERPL-MCNC: 7 G/DL — SIGNIFICANT CHANGE UP (ref 6–8.3)
RBC # BLD: 3.93 M/UL — SIGNIFICANT CHANGE UP (ref 3.8–5.2)
RBC # FLD: 13.2 % — SIGNIFICANT CHANGE UP (ref 10.3–14.5)
SODIUM SERPL-SCNC: 141 MMOL/L — SIGNIFICANT CHANGE UP (ref 135–145)
SPECIMEN SOURCE: SIGNIFICANT CHANGE UP
WBC # BLD: 4.75 K/UL — SIGNIFICANT CHANGE UP (ref 3.8–10.5)
WBC # FLD AUTO: 4.75 K/UL — SIGNIFICANT CHANGE UP (ref 3.8–10.5)

## 2018-06-25 PROCEDURE — 99232 SBSQ HOSP IP/OBS MODERATE 35: CPT

## 2018-06-25 PROCEDURE — 99239 HOSP IP/OBS DSCHRG MGMT >30: CPT

## 2018-06-25 PROCEDURE — 76775 US EXAM ABDO BACK WALL LIM: CPT | Mod: 26

## 2018-06-25 RX ORDER — DOCUSATE SODIUM 100 MG
100 CAPSULE ORAL THREE TIMES A DAY
Refills: 0 | Status: DISCONTINUED | OUTPATIENT
Start: 2018-06-25 | End: 2018-06-25

## 2018-06-25 RX ORDER — SENNA PLUS 8.6 MG/1
2 TABLET ORAL AT BEDTIME
Refills: 0 | Status: DISCONTINUED | OUTPATIENT
Start: 2018-06-25 | End: 2018-06-25

## 2018-06-25 NOTE — DISCHARGE NOTE ADULT - CARE PROVIDERS DIRECT ADDRESSES
,DirectAddress_Unknown ,DirectAddress_Unknown,[email or web address]

## 2018-06-25 NOTE — PROGRESS NOTE ADULT - ASSESSMENT
32F with hx of dysautonomia (tachycardia) diagnosed in 2016 at [facility] now presenting for a call back on her urine culture which grew ESBL.

## 2018-06-25 NOTE — PROGRESS NOTE ADULT - SUBJECTIVE AND OBJECTIVE BOX
Patient is a 32y old  Female who presents with a chief complaint of ESBL in urine (2018 11:56)      SUBJECTIVE / OVERNIGHT EVENTS: No overnight events; pt feels overall improved, denies dysuria, but still has mild suprapubic pressure.  Pt agreeable to following PMD on discharge for discussion of unintentional weight loss.        MEDICATIONS  (STANDING):  enoxaparin Injectable 40 milliGRAM(s) SubCutaneous daily  ertapenem  IVPB 1000 milliGRAM(s) IV Intermittent every 24 hours  pantoprazole    Tablet 40 milliGRAM(s) Oral before breakfast    MEDICATIONS  (PRN):  acetaminophen   Tablet 650 milliGRAM(s) Oral every 6 hours PRN For Temp greater than 38 C (100.4 F)  acetaminophen   Tablet. 650 milliGRAM(s) Oral every 6 hours PRN Mild Pain (1 - 3)      Vital Signs Last 24 Hrs  T(C): 36.8 (2018 02:24), Max: 36.8 (2018 17:29)  T(F): 98.3 (2018 02:24), Max: 98.3 (2018 02:24)  HR: 70 (2018 06:00) (70 - 88)  BP: 98/59 (2018 06:00) (92/63 - 104/73)  BP(mean): --  RR: 18 (2018 06:00) (18 - 18)  SpO2: 100% (2018 06:00) (100% - 100%)  CAPILLARY BLOOD GLUCOSE      PHYSICAL EXAM  GENERAL: NAD, well-developed  HEAD:  Atraumatic, Normocephalic  EYES: EOMI, PERRLA, conjunctiva and sclera clear  NECK: Supple, No JVD  CHEST/LUNG: Clear to auscultation bilaterally; No wheeze  HEART: Regular rate and rhythm; No murmurs, rubs, or gallops  ABDOMEN: Soft, Nontender, Nondistended; Bowel sounds present  EXTREMITIES:  2+ Peripheral Pulses, No clubbing, cyanosis, or edema  PSYCH: AAOx3  SKIN: No rashes or lesions    LABS:                        11.2   4.75  )-----------( 257      ( 2018 06:00 )             35.5     06-25    141  |  105  |  4<L>  ----------------------------<  75  4.2   |  24  |  0.58    Ca    8.7      2018 06:00  Phos  2.4     06-25  Mg     2.2     06-25    TPro  7.0  /  Alb  4.0  /  TBili  0.3  /  DBili  x   /  AST  15  /  ALT  8   /  AlkPhos  46  06-25          Urinalysis Basic - ( 2018 14:45 )    Color: PLYEL / Appearance: CLEAR / S.006 / pH: 7.0  Gluc: NEGATIVE / Ketone: NEGATIVE  / Bili: NEGATIVE / Urobili: NORMAL mg/dL   Blood: NEGATIVE / Protein: NEGATIVE mg/dL / Nitrite: NEGATIVE   Leuk Esterase: NEGATIVE / RBC: 0-2 / WBC 0-2   Sq Epi: OCC / Non Sq Epi: x / Bacteria: x            Consultant(s) Notes Reviewed:  ID

## 2018-06-25 NOTE — PROGRESS NOTE ADULT - PROBLEM SELECTOR PLAN 2
- may be 2/2 dysautonomia, GI work up previously negative except for gastritis. Pt to f/u with GI as outpatient  - no leukocytosis   - pt does have increased bruising  - may consider a heme w/u outpt; pt to follow up with PMD as outpatient   - encourage PO intake

## 2018-06-25 NOTE — DISCHARGE NOTE ADULT - CARE PROVIDER_API CALL
[facility],   Phone: [phone]  Fax: (   )    - [facility],   Phone: [phone]  Fax: (   )    -    Christopher Carcamo), Hematology; Internal Medicine; Oncology  [address] [place] [address]  Phone: [phone]  Fax: [phone]

## 2018-06-25 NOTE — DISCHARGE NOTE ADULT - MEDICATION SUMMARY - MEDICATIONS TO TAKE
I will START or STAY ON the medications listed below when I get home from the hospital:    metoprolol  -- 12.5 milligram(s) by mouth 2 times a day  -- Indication: For Dysautonomia    senna oral tablet  -- 2 tab(s) by mouth once a day (at bedtime)  -- Indication: For constipation    docusate sodium 100 mg oral capsule  -- 1 cap(s) by mouth 3 times a day  -- Indication: For constipation    simethicone 80 mg oral tablet, chewable  -- 1 tab(s) by mouth every 6 hours, As needed, Indigestion  -- Indication: For gas    sucralfate 1 g oral tablet  -- 1 tab(s) by mouth 3 times a day  -- Indication: For GERD    pantoprazole 40 mg oral delayed release tablet  -- 1 tab(s) by mouth once a day (before a meal)  -- Indication: For GERD    Multiple Vitamins oral tablet  -- 1 tab(s) by mouth once a day  -- Indication: For Prophylactic measure    cyanocobalamin 100 mcg oral tablet  -- 1 tab(s) by mouth once a day  -- Indication: For Prophylactic measure    cholecalciferol oral tablet  -- 1000 unit(s) by mouth once a day  -- Indication: For Prophylactic measure

## 2018-06-25 NOTE — PROGRESS NOTE ADULT - PROBLEM SELECTOR PLAN 4
- lovenox  - Stable for discharge.    31 minutes spent coordinating discharge including discussion of PMD evaluation for unintentional weight loss and she is in agreement.

## 2018-06-25 NOTE — DISCHARGE NOTE ADULT - HOSPITAL COURSE
31 yo F with hx of dysautonomia (tachycardia) diagnosed in 2016 at [facility] now presenting for a call back on her urine culture which grew ESBL.    Pyelonephritis.    - ESBL on urine culture  - ertapenem as per ID consult  - f/u blood cultures (which were drawn post abx infusion in ED today).   - ID consult              --Continue Ertapenem 1g IV Q24H             -- Renal US - negative            Weight loss, non-intentional.    - may be 2/2 dysautonomia, GI work up previously negative except for gastritis  - no leukocytosis   - pt does have increased bruising  - may consider a heme w/u outpt  - encourage PO intake. - may be 2/2 dysautonomia, GI work up previously negative except for gastritis  - no leukocytosis   - pt does have increased bruising  - may consider a heme w/u  - encourage PO intake     Dysautonomia.    - heart rate controlled, BP normal at this time  - if needed, will use lopressor as pt was responsive to it in 2016 when this was diagnosed.     Prophylactic measure.    - lovenox. 33 yo F with hx of dysautonomia (tachycardia) diagnosed in 2016 at [facility] now presenting for a call back on her urine culture which grew ESBL.    Pyelonephritis.    -6/20 -  ESBL on urine culture.   6/23 - urine culture - negative   - ertapenem as per ID consult  - f/u blood cultures (which were drawn post abx infusion in ED today). NTD   - ID consult              --Continue Ertapenem 1g IV Q24H - x 2 days.  D/C on no abx.              -- Renal US - Appearance suggestive of nephrocalcinosis/medullary sponge kidney.            Weight loss, non-intentional.    - may be 2/2 dysautonomia, GI work up previously negative except for gastritis  - no leukocytosis   - pt does have increased bruising  - may consider a heme w/u outpt  - encourage PO intake. - may be 2/2 dysautonomia, GI work up previously negative except for gastritis  - no leukocytosis   - pt does have increased bruising  - may consider a heme w/u  - encourage PO intake     Dysautonomia.    - heart rate controlled, BP normal at this time  - if needed, will use lopressor as pt was responsive to it in 2016 when this was diagnosed.     Prophylactic measure.    - lovenox. 33 yo F with hx of dysautonomia (tachycardia) diagnosed in 2016 at [facility] now presenting for a call back on her urine culture which grew ESBL.  She was admitted for 3 days of IV ertapanem with ID following.  Repeat Ucx were negative and pt's symptoms improved significantly.  Pt also reported unintentional weight loss and presence of subcentimeter enlarged lymph nodes for the past 2 years.  She was advised to followup with her PMD for referral to hematology for further workup.

## 2018-06-25 NOTE — PROGRESS NOTE ADULT - PROBLEM SELECTOR PLAN 1
- ESBL on urine culture  - To complete final dose of IV ertapenem today, appreciate ID recs  - Bcx and repeat Ucx negative

## 2018-06-25 NOTE — DISCHARGE NOTE ADULT - CARE PLAN
Principal Discharge DX:	Pyelonephritis  Goal:	to be infection free  Assessment and plan of treatment:	completed two days of IV antibiotics  Secondary Diagnosis:	Dysautonomia  Goal:	to be normotensive and have normal heart rate  Assessment and plan of treatment:	supportive care  Secondary Diagnosis:	Weight loss, non-intentional  Goal:	to prevent further weight loss  Assessment and plan of treatment:	to increase nutrient intake Principal Discharge DX:	Pyelonephritis  Goal:	to be infection free  Assessment and plan of treatment:	completed two days of IV antibiotics  Secondary Diagnosis:	Dysautonomia  Goal:	to be normotensive and have normal heart rate  Assessment and plan of treatment:	supportive care  Secondary Diagnosis:	Weight loss, non-intentional  Goal:	to prevent further weight loss  Assessment and plan of treatment:	to increase nutrient intake.  Please follow up with your PMD to discuss unintentional weight loss and to be referred to hematology for enlarged lymph nodes and gastroenterology for evaluation of loose stools.  You have seen DR. Christopher Carcamo in the past or you may followup with our [facility] ACU clinic, both numbers provided below.

## 2018-06-25 NOTE — DISCHARGE NOTE ADULT - PROVIDER TOKENS
FREE:[LAST:[[facility]],PHONE:[phone]],FAX:[(   )    -]] FREE:[LAST:[[facility]],PHONE:[phone]],FAX:[(   )    -]],TOKEN:'[id number]'

## 2018-06-25 NOTE — DISCHARGE NOTE ADULT - PATIENT PORTAL LINK FT
You can access the [facility] Patient Portal, offered by [facility], by registering with the following website: [email or web address]

## 2018-06-25 NOTE — DISCHARGE NOTE ADULT - PLAN OF CARE
to be infection free completed two days of IV antibiotics to be normotensive and have normal heart rate supportive care to prevent further weight loss to increase nutrient intake to increase nutrient intake.  Please follow up with your PMD to discuss unintentional weight loss and to be referred to hematology for enlarged lymph nodes and gastroenterology for evaluation of loose stools.  You have seen DR. Christopher Carcamo in the past or you may followup with our [facility] ACU clinic, both numbers provided below.

## 2018-06-25 NOTE — PROGRESS NOTE ADULT - SUBJECTIVE AND OBJECTIVE BOX
Follow Up:      Interval History/ROS: no dysuria, though she has some lower back pain, she denies dysuria, ; no flank pain    Allergies  Milk (Hives)  No Known Drug Allergies  peanuts (Hives)  Tomatoes (Hives)    ANTIMICROBIALS:  ertapenem  IVPB 1000 every 24 hours    OTHER MEDS:  MEDICATIONS  (STANDING):  acetaminophen   Tablet 650 every 6 hours PRN  acetaminophen   Tablet. 650 every 6 hours PRN  enoxaparin Injectable 40 daily  pantoprazole    Tablet 40 before breakfast      Vital Signs Last 24 Hrs  T(C): 36.8 (2018 02:24), Max: 36.8 (2018 17:29)  T(F): 98.3 (2018 02:24), Max: 98.3 (2018 02:24)  HR: 70 (2018 06:00) (70 - 97)  BP: 98/59 (2018 06:00) (92/63 - 105/63)  BP(mean): --  RR: 18 (2018 06:00) (18 - 18)  SpO2: 100% (2018 06:00) (100% - 100%)    PHYSICAL EXAM:  General: WN/WD NAD, Non-toxic  Neurology: A&Ox3, nonfocal  Abdominal: Soft, Non-tender, non-distended, no cvat  Extremities: No edema  Line Sites: Clear  Skin: No rash                        11.2   4.75  )-----------( 257      ( 2018 06:00 )             35.5       06-25    141  |  105  |  4<L>  ----------------------------<  75  4.2   |  24  |  0.58    Ca    8.7      2018 06:00  Phos  2.4     06-25  Mg     2.2     06-25    TPro  7.0  /  Alb  4.0  /  TBili  0.3  /  DBili  x   /  AST  15  /  ALT  8   /  AlkPhos  46  06-25      Urinalysis Basic - ( 2018 14:45 )    Color: PLYEL / Appearance: CLEAR / S.006 / pH: 7.0  Gluc: NEGATIVE / Ketone: NEGATIVE  / Bili: NEGATIVE / Urobili: NORMAL mg/dL   Blood: NEGATIVE / Protein: NEGATIVE mg/dL / Nitrite: NEGATIVE   Leuk Esterase: NEGATIVE / RBC: 0-2 / WBC 0-2   Sq Epi: OCC / Non Sq Epi: x / Bacteria: x        MICROBIOLOGY:  URINE MIDSTREAM  18 --  --  --      BLOOD PERIPHERAL  18 --  --  --      URINE MIDSTREAM  18 --  --  E.COLI ESBL POSITIVE        RADIOLOGY:  < from: US Renal (18 @ 08:19) >  FINDINGS:    Right kidney:  11.8 cm. No renal mass, hydronephrosis or calculi.    Left kidney:  10.2 cm. No renal mass, hydronephrosis or calculi. 1 cm   cyst.    Both kidneys demonstrate echogenic renal pyramids suggestive of   nephrocalcinosis/medullary sponge kidney.    Urinary bladder: Within normal limits.    IMPRESSION:     Appearance suggestive of nephrocalcinosis/medullary sponge kidney.    < end of copied text >      Indio Shen MD; Division of Infectious Disease; Pager: [phone]; nights and weekends: [phone]

## 2018-06-25 NOTE — PROGRESS NOTE ADULT - ASSESSMENT
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

## 2018-06-28 LAB
BACTERIA BLD CULT: SIGNIFICANT CHANGE UP
BACTERIA BLD CULT: SIGNIFICANT CHANGE UP

## 2018-07-02 ENCOUNTER — EMERGENCY (EMERGENCY)
Facility: HOSPITAL | Age: 33
LOS: 1 days | Discharge: HOME | End: 2018-07-02
Attending: EMERGENCY MEDICINE | Admitting: EMERGENCY MEDICINE
Payer: COMMERCIAL

## 2018-07-02 ENCOUNTER — TRANSCRIPTION ENCOUNTER (OUTPATIENT)
Age: 33
End: 2018-07-02

## 2018-07-02 VITALS
TEMPERATURE: 98 F | OXYGEN SATURATION: 98 % | RESPIRATION RATE: 18 BRPM | DIASTOLIC BLOOD PRESSURE: 82 MMHG | SYSTOLIC BLOOD PRESSURE: 117 MMHG | HEART RATE: 85 BPM

## 2018-07-02 DIAGNOSIS — Z87.59 PERSONAL HISTORY OF OTHER COMPLICATIONS OF PREGNANCY, CHILDBIRTH AND THE PUERPERIUM: Chronic | ICD-10-CM

## 2018-07-02 LAB
ALBUMIN SERPL ELPH-MCNC: 4.4 G/DL — SIGNIFICANT CHANGE UP (ref 3.3–5)
ALP SERPL-CCNC: 56 U/L — SIGNIFICANT CHANGE UP (ref 40–120)
ALT FLD-CCNC: 14 U/L — SIGNIFICANT CHANGE UP (ref 4–33)
APPEARANCE UR: CLEAR — SIGNIFICANT CHANGE UP
AST SERPL-CCNC: 24 U/L — SIGNIFICANT CHANGE UP (ref 4–32)
BASOPHILS # BLD AUTO: 0.03 K/UL — SIGNIFICANT CHANGE UP (ref 0–0.2)
BASOPHILS NFR BLD AUTO: 0.4 % — SIGNIFICANT CHANGE UP (ref 0–2)
BILIRUB SERPL-MCNC: 0.2 MG/DL — SIGNIFICANT CHANGE UP (ref 0.2–1.2)
BILIRUB UR-MCNC: NEGATIVE — SIGNIFICANT CHANGE UP
BLOOD UR QL VISUAL: NEGATIVE — SIGNIFICANT CHANGE UP
BUN SERPL-MCNC: 6 MG/DL — LOW (ref 7–23)
CALCIUM SERPL-MCNC: 8.8 MG/DL — SIGNIFICANT CHANGE UP (ref 8.4–10.5)
CHLORIDE SERPL-SCNC: 102 MMOL/L — SIGNIFICANT CHANGE UP (ref 98–107)
CO2 SERPL-SCNC: 24 MMOL/L — SIGNIFICANT CHANGE UP (ref 22–31)
COLOR SPEC: SIGNIFICANT CHANGE UP
CREAT SERPL-MCNC: 0.69 MG/DL — SIGNIFICANT CHANGE UP (ref 0.5–1.3)
EOSINOPHIL # BLD AUTO: 0.09 K/UL — SIGNIFICANT CHANGE UP (ref 0–0.5)
EOSINOPHIL NFR BLD AUTO: 1.2 % — SIGNIFICANT CHANGE UP (ref 0–6)
GLUCOSE SERPL-MCNC: 84 MG/DL — SIGNIFICANT CHANGE UP (ref 70–99)
GLUCOSE UR-MCNC: NEGATIVE — SIGNIFICANT CHANGE UP
HCT VFR BLD CALC: 35.7 % — SIGNIFICANT CHANGE UP (ref 34.5–45)
HGB BLD-MCNC: 11.6 G/DL — SIGNIFICANT CHANGE UP (ref 11.5–15.5)
IMM GRANULOCYTES # BLD AUTO: 0.01 # — SIGNIFICANT CHANGE UP
IMM GRANULOCYTES NFR BLD AUTO: 0.1 % — SIGNIFICANT CHANGE UP (ref 0–1.5)
KETONES UR-MCNC: NEGATIVE — SIGNIFICANT CHANGE UP
LEUKOCYTE ESTERASE UR-ACNC: NEGATIVE — SIGNIFICANT CHANGE UP
LIDOCAIN IGE QN: 30.3 U/L — SIGNIFICANT CHANGE UP (ref 7–60)
LYMPHOCYTES # BLD AUTO: 1.72 K/UL — SIGNIFICANT CHANGE UP (ref 1–3.3)
LYMPHOCYTES # BLD AUTO: 22.8 % — SIGNIFICANT CHANGE UP (ref 13–44)
MCHC RBC-ENTMCNC: 27.8 PG — SIGNIFICANT CHANGE UP (ref 27–34)
MCHC RBC-ENTMCNC: 32.5 % — SIGNIFICANT CHANGE UP (ref 32–36)
MCV RBC AUTO: 85.4 FL — SIGNIFICANT CHANGE UP (ref 80–100)
MONOCYTES # BLD AUTO: 0.56 K/UL — SIGNIFICANT CHANGE UP (ref 0–0.9)
MONOCYTES NFR BLD AUTO: 7.4 % — SIGNIFICANT CHANGE UP (ref 2–14)
NEUTROPHILS # BLD AUTO: 5.15 K/UL — SIGNIFICANT CHANGE UP (ref 1.8–7.4)
NEUTROPHILS NFR BLD AUTO: 68.1 % — SIGNIFICANT CHANGE UP (ref 43–77)
NITRITE UR-MCNC: NEGATIVE — SIGNIFICANT CHANGE UP
NRBC # FLD: 0 — SIGNIFICANT CHANGE UP
PH UR: 6.5 — SIGNIFICANT CHANGE UP (ref 4.6–8)
PLATELET # BLD AUTO: 247 K/UL — SIGNIFICANT CHANGE UP (ref 150–400)
PMV BLD: 10.1 FL — SIGNIFICANT CHANGE UP (ref 7–13)
POTASSIUM SERPL-MCNC: 4.4 MMOL/L — SIGNIFICANT CHANGE UP (ref 3.5–5.3)
POTASSIUM SERPL-SCNC: 4.4 MMOL/L — SIGNIFICANT CHANGE UP (ref 3.5–5.3)
PROT SERPL-MCNC: 7.4 G/DL — SIGNIFICANT CHANGE UP (ref 6–8.3)
PROT UR-MCNC: NEGATIVE MG/DL — SIGNIFICANT CHANGE UP
RBC # BLD: 4.18 M/UL — SIGNIFICANT CHANGE UP (ref 3.8–5.2)
RBC # FLD: 13.2 % — SIGNIFICANT CHANGE UP (ref 10.3–14.5)
SODIUM SERPL-SCNC: 137 MMOL/L — SIGNIFICANT CHANGE UP (ref 135–145)
SP GR SPEC: 1 — SIGNIFICANT CHANGE UP (ref 1–1.04)
SQUAMOUS # UR AUTO: SIGNIFICANT CHANGE UP
UROBILINOGEN FLD QL: NORMAL MG/DL — SIGNIFICANT CHANGE UP
WBC # BLD: 7.56 K/UL — SIGNIFICANT CHANGE UP (ref 3.8–10.5)
WBC # FLD AUTO: 7.56 K/UL — SIGNIFICANT CHANGE UP (ref 3.8–10.5)
WBC UR QL: SIGNIFICANT CHANGE UP (ref 0–?)

## 2018-07-02 PROCEDURE — 99284 EMERGENCY DEPT VISIT MOD MDM: CPT

## 2018-07-02 RX ORDER — SODIUM CHLORIDE 9 MG/ML
2000 INJECTION INTRAMUSCULAR; INTRAVENOUS; SUBCUTANEOUS ONCE
Refills: 0 | Status: COMPLETED | OUTPATIENT
Start: 2018-07-02 | End: 2018-07-02

## 2018-07-02 RX ADMIN — SODIUM CHLORIDE 2000 MILLILITER(S): 9 INJECTION INTRAMUSCULAR; INTRAVENOUS; SUBCUTANEOUS at 12:52

## 2018-07-02 NOTE — ED ADULT TRIAGE NOTE - CHIEF COMPLAINT QUOTE
p/t recently treated for uti, c/o of lower abd pain and diarrhea for few days denies any nausea or vomiting

## 2018-07-02 NOTE — ED PROVIDER NOTE - OBJECTIVE STATEMENT
33 y/o female hx GERD, recent admission to [facility] for ESBL+ UTI presents to ED c/o abdominal pain and diarrhea. Pt. recently admitted to [facility] for ESBL+ UTI - received 3 days of IV Ertapenem and dc'd on 6/25. Pt. states 2 days later started to develop crampng lower abdominal pain intermittent in nature becoming more constant and also worsening diarrhea - states 10-20 episodes of watery diarrhea, and that sometimes it is black. Pt. states taking tums for symptoms with minimal relief. Deneis fever chills urinary symptoms weakness dizziness loc.

## 2018-07-02 NOTE — ED PROVIDER NOTE - PROGRESS NOTE DETAILS
Patient unable to provide stool sample in ED.  Given lack of diarrhea and normal labs, less likely C diff.  Given culture cup to take home and instructed to bring stool sample to PCP for lab test for C diff.  Discharge with return precautions.

## 2018-07-02 NOTE — ED PROVIDER NOTE - MEDICAL DECISION MAKING DETAILS
33 y/o female c/o lower abdominal pain x 1 week w/ diarrhea  -labs, ua/ucx,  iv fluids, cdiff pcr  -reassess

## 2018-07-03 LAB — SPECIMEN SOURCE: SIGNIFICANT CHANGE UP

## 2018-07-04 LAB — BACTERIA UR CULT: SIGNIFICANT CHANGE UP

## 2018-07-06 ENCOUNTER — APPOINTMENT (OUTPATIENT)
Dept: INTERNAL MEDICINE | Facility: CLINIC | Age: 33
End: 2018-07-06
Payer: MEDICAID

## 2018-07-06 ENCOUNTER — NON-APPOINTMENT (OUTPATIENT)
Age: 33
End: 2018-07-06

## 2018-07-06 ENCOUNTER — LABORATORY RESULT (OUTPATIENT)
Age: 33
End: 2018-07-06

## 2018-07-06 VITALS
DIASTOLIC BLOOD PRESSURE: 74 MMHG | SYSTOLIC BLOOD PRESSURE: 106 MMHG | HEIGHT: 64.5 IN | WEIGHT: 105 LBS | HEART RATE: 80 BPM | BODY MASS INDEX: 17.71 KG/M2

## 2018-07-06 DIAGNOSIS — T78.40XA ALLERGY, UNSPECIFIED, INITIAL ENCOUNTER: ICD-10-CM

## 2018-07-06 LAB
BILIRUB UR QL STRIP: NORMAL
BILIRUB UR QL STRIP: NORMAL
CLARITY UR: CLEAR
CLARITY UR: CLEAR
COLLECTION METHOD: NORMAL
COLLECTION METHOD: NORMAL
GLUCOSE UR-MCNC: NORMAL
GLUCOSE UR-MCNC: NORMAL
HCG UR QL: 0.2 EU/DL
HCG UR QL: 0.2 EU/DL
HGB UR QL STRIP.AUTO: NORMAL
HGB UR QL STRIP.AUTO: NORMAL
KETONES UR-MCNC: NORMAL
KETONES UR-MCNC: NORMAL
LEUKOCYTE ESTERASE UR QL STRIP: NORMAL
LEUKOCYTE ESTERASE UR QL STRIP: NORMAL
NITRITE UR QL STRIP: NORMAL
NITRITE UR QL STRIP: NORMAL
PH UR STRIP: 7
PH UR STRIP: 7
PROT UR STRIP-MCNC: NORMAL
PROT UR STRIP-MCNC: NORMAL
SP GR UR STRIP: 1.01
SP GR UR STRIP: 1.01

## 2018-07-06 PROCEDURE — 99395 PREV VISIT EST AGE 18-39: CPT | Mod: 25

## 2018-07-06 PROCEDURE — 93000 ELECTROCARDIOGRAM COMPLETE: CPT

## 2018-07-06 PROCEDURE — 36415 COLL VENOUS BLD VENIPUNCTURE: CPT

## 2018-07-06 PROCEDURE — 81003 URINALYSIS AUTO W/O SCOPE: CPT | Mod: QW

## 2018-07-08 PROBLEM — T78.40XA ALLERGIC REACTION: Status: ACTIVE | Noted: 2018-07-06

## 2018-07-08 NOTE — ASSESSMENT
[FreeTextEntry1] : Patient presents for CPE as well as follow up on an ER visit as well as follow up on doctor visits in [place] as well as many acute complaints.  \par \par Patient has had many episodes of tachycardia and palpitations, had a workup at the ER over  year ago, no abnormalities found after workup. Hada similar event in [place] after seeing us here.  Always had what we know of as a sinus tachycardia, suspect she has some form of anxiety disorder. Strognly reccomended above allelse to see a psychiatrist. made a referal.  \par \par Patient had seen an endocrinologist in [place] who had performed a large battery of allergens. Patient requested that they  be repeated here.  This Panel contained over 100 items, some of which are not available here in the [place], some of which I believe are not even testable. Doing so would also probably use almost 100 vials of blood and cost several hundred thousand dollars.  For example a there is no allergen I am aware of for "paint" or "yarn" as there are many varieties of these. Highly suspect that this testing may be erroneous or misleading.  Reccomended she instead see an allergist for more standardized us testing.  \par Breast pain probable fibrocystic changes.  Recommended mammogram and ultrasound.  \par \par Recommended she see  GI to follow up on the rectal bleed. SHe had a paper with her with a phone number from [facility] ER to call. Gave er some recommendations for the name on the list. Went over this with her several time. At the conclusion of the visit, had the  staff go over this with her again.  \par Recommended Gynecologic evaluation.  \par Spent > 75 minutes in direct patient care and and addressed all questions and concerns.  >50% of this time was in direct face to face contact with patient during exam and counseling. Followed up on  the ER discharges and multiple specialty visits both here and [place]. And performed CPE.
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
## 2018-07-08 NOTE — PHYSICAL EXAM

## 2018-07-09 LAB
25(OH)D3 SERPL-MCNC: 53.2 NG/ML
CHOLEST SERPL-MCNC: 195 MG/DL
CHOLEST/HDLC SERPL: 3.8 RATIO
FERRITIN SERPL-MCNC: 17 NG/ML
HBA1C MFR BLD HPLC: 5.4 %
HDLC SERPL-MCNC: 52 MG/DL
IRON SERPL-MCNC: 68 UG/DL
LDLC SERPL CALC-MCNC: 102 MG/DL
SAVE SPECIMEN: NORMAL
T3RU NFR SERPL: 1.13 INDEX
TRIGL SERPL-MCNC: 203 MG/DL
TSH SERPL-ACNC: 3.41 UIU/ML
URATE SERPL-MCNC: 4.6 MG/DL
VIT B12 SERPL-MCNC: 1048 PG/ML

## 2018-07-17 ENCOUNTER — APPOINTMENT (OUTPATIENT)
Dept: ALLERGY | Facility: CLINIC | Age: 33
End: 2018-07-17
Payer: MEDICAID

## 2018-07-17 VITALS
DIASTOLIC BLOOD PRESSURE: 65 MMHG | BODY MASS INDEX: 17.71 KG/M2 | HEART RATE: 70 BPM | WEIGHT: 105 LBS | HEIGHT: 64.5 IN | SYSTOLIC BLOOD PRESSURE: 110 MMHG | RESPIRATION RATE: 14 BRPM

## 2018-07-17 PROCEDURE — 95018 ALL TSTG PERQ&IQ DRUGS/BIOL: CPT

## 2018-07-17 PROCEDURE — 99204 OFFICE O/P NEW MOD 45 MIN: CPT | Mod: 25

## 2018-07-17 PROCEDURE — 95004 PERQ TESTS W/ALRGNC XTRCS: CPT

## 2018-07-17 RX ORDER — PANTOPRAZOLE 40 MG/1
40 TABLET, DELAYED RELEASE ORAL
Qty: 90 | Refills: 0 | Status: DISCONTINUED | COMMUNITY
Start: 2017-02-17 | End: 2018-07-17

## 2018-07-18 ENCOUNTER — APPOINTMENT (OUTPATIENT)
Dept: GASTROENTEROLOGY | Facility: CLINIC | Age: 33
End: 2018-07-18
Payer: COMMERCIAL

## 2018-07-18 VITALS
DIASTOLIC BLOOD PRESSURE: 60 MMHG | BODY MASS INDEX: 18.04 KG/M2 | SYSTOLIC BLOOD PRESSURE: 96 MMHG | TEMPERATURE: 97.5 F | HEIGHT: 64.5 IN | RESPIRATION RATE: 12 BRPM | HEART RATE: 74 BPM | OXYGEN SATURATION: 99 % | WEIGHT: 107 LBS

## 2018-07-18 PROCEDURE — 99214 OFFICE O/P EST MOD 30 MIN: CPT

## 2018-07-27 PROBLEM — Z80.0 FAMILY HISTORY OF COLON CANCER: Status: INACTIVE | Noted: 2017-02-17

## 2018-08-01 ENCOUNTER — APPOINTMENT (OUTPATIENT)
Dept: MAMMOGRAPHY | Facility: IMAGING CENTER | Age: 33
End: 2018-08-01

## 2018-08-01 ENCOUNTER — APPOINTMENT (OUTPATIENT)
Dept: ULTRASOUND IMAGING | Facility: IMAGING CENTER | Age: 33
End: 2018-08-01

## 2018-08-03 ENCOUNTER — APPOINTMENT (OUTPATIENT)
Dept: INTERNAL MEDICINE | Facility: CLINIC | Age: 33
End: 2018-08-03
Payer: COMMERCIAL

## 2018-08-03 VITALS — HEIGHT: 64 IN

## 2018-08-03 VITALS — DIASTOLIC BLOOD PRESSURE: 72 MMHG | SYSTOLIC BLOOD PRESSURE: 108 MMHG | HEART RATE: 103 BPM

## 2018-08-03 DIAGNOSIS — N83.209 UNSPECIFIED OVARIAN CYST, UNSPECIFIED SIDE: ICD-10-CM

## 2018-08-03 PROCEDURE — 99215 OFFICE O/P EST HI 40 MIN: CPT

## 2018-08-03 NOTE — ASSESSMENT
[FreeTextEntry1] : Breast pain fibrocystic changes, again recommended ultrasound and mammogram. Patient refusing mammogram.Instructed her to please at least go for the ultrasound\par \par Severe anxiety, patient still has not gone to the psychiatrist. Recommended strongly that she go. IN case she does not reccomended she at last start something to help her with the anxiety, reccomended sertraline 25mg daily.  Instructed her to return in a month for follow up on this.  \par \par GERD, throat feels stuck, informed her that these are probably related.  Insisted that she please  the pantoprazole 40mg daily that I had previously recommended tums is not sufficient. She will follow up with GI.  \par \par Hx of ovarian cysts, instructed her to see GI and get a trans vaginal ultrasound for re-evaluation.\par \par Dizziness weakness fatigue probably from sleep deprivation, probably sleeps 2-3 hours a night. REcocmedend patient take the pantoprazole and the sertraline.  \par \par We had her evaluated for the palpitations chest pain and tachycardia once before last year. Turned out negative.  Suspect heart rate is not actually 180 at night.  PRobably anxiety.  \par \par  Hx of medullary sponge kidney, recommended she see a nephrologist, Get a repeat renalultrasound.  \par Spent > 40 minutes in direct patient care and and addressed all questions and concerns.  >50% of this time was in direct face to face contact with patient during exam and counseling.

## 2018-08-03 NOTE — HISTORY OF PRESENT ILLNESS
[de-identified] : Patient presents with a massive number of complaints and concerns.  Patient afraid of mammograms.  Still having breast pain still has not gone for the mammogram or ultrasound.  \par \par Had an ultrasound in the pelvis in [place], was told to follow up with this but has not gone in the states.  \par Has not slept well in over a month.  wakes up many times at night.  Suspect shes getting only 2-3 hours a night.\par \par Still has not seen the psychiatrist yet.  \par Still feels terrible. wakes up in middle of night with sever shortness of breath Heart rate goes up to "180".   Associated with GERD\par \par Still complains of choking sensation in  the throat.  \par \par Not taking the pantoprazole.  States that the pharmacy did not have the prescription.  \par \par Going to GI for endoscopy soon.  \par \par

## 2018-08-03 NOTE — REVIEW OF SYSTEMS
[Fatigue] : fatigue [Chest Pain] : chest pain [Palpitations] : palpitations [Shortness Of Breath] : shortness of breath [Abdominal Pain] : abdominal pain [Nausea] : nausea [Joint Pain] : joint pain [Muscle Pain] : muscle pain [Dizziness] : dizziness [Memory Loss] : memory loss [Anxiety] : anxiety [Depression] : depression [Negative] : Integumentary [FreeTextEntry4] : difficulty swallowing.

## 2018-08-09 ENCOUNTER — FORM ENCOUNTER (OUTPATIENT)
Age: 33
End: 2018-08-09

## 2018-08-10 ENCOUNTER — OUTPATIENT (OUTPATIENT)
Dept: OUTPATIENT SERVICES | Facility: HOSPITAL | Age: 33
LOS: 1 days | End: 2018-08-10
Payer: COMMERCIAL

## 2018-08-10 ENCOUNTER — APPOINTMENT (OUTPATIENT)
Dept: ULTRASOUND IMAGING | Facility: CLINIC | Age: 33
End: 2018-08-10
Payer: COMMERCIAL

## 2018-08-10 DIAGNOSIS — Z87.59 PERSONAL HISTORY OF OTHER COMPLICATIONS OF PREGNANCY, CHILDBIRTH AND THE PUERPERIUM: Chronic | ICD-10-CM

## 2018-08-10 DIAGNOSIS — Z00.8 ENCOUNTER FOR OTHER GENERAL EXAMINATION: ICD-10-CM

## 2018-08-10 DIAGNOSIS — N83.209 UNSPECIFIED OVARIAN CYST, UNSPECIFIED SIDE: ICD-10-CM

## 2018-08-10 PROCEDURE — 76775 US EXAM ABDO BACK WALL LIM: CPT | Mod: 26

## 2018-08-10 PROCEDURE — 76830 TRANSVAGINAL US NON-OB: CPT

## 2018-08-10 PROCEDURE — 76830 TRANSVAGINAL US NON-OB: CPT | Mod: 26

## 2018-08-10 PROCEDURE — 76775 US EXAM ABDO BACK WALL LIM: CPT

## 2018-08-13 ENCOUNTER — MESSAGE (OUTPATIENT)
Age: 33
End: 2018-08-13

## 2018-08-14 ENCOUNTER — APPOINTMENT (OUTPATIENT)
Dept: OBGYN | Facility: CLINIC | Age: 33
End: 2018-08-14

## 2018-08-17 ENCOUNTER — RX RENEWAL (OUTPATIENT)
Age: 33
End: 2018-08-17

## 2018-08-20 ENCOUNTER — APPOINTMENT (OUTPATIENT)
Dept: GASTROENTEROLOGY | Facility: HOSPITAL | Age: 33
End: 2018-08-20

## 2018-08-22 ENCOUNTER — APPOINTMENT (OUTPATIENT)
Dept: ALLERGY | Facility: CLINIC | Age: 33
End: 2018-08-22

## 2018-09-04 ENCOUNTER — APPOINTMENT (OUTPATIENT)
Dept: ULTRASOUND IMAGING | Facility: CLINIC | Age: 33
End: 2018-09-04

## 2018-09-04 ENCOUNTER — APPOINTMENT (OUTPATIENT)
Dept: MAMMOGRAPHY | Facility: CLINIC | Age: 33
End: 2018-09-04

## 2018-09-18 ENCOUNTER — APPOINTMENT (OUTPATIENT)
Dept: INTERNAL MEDICINE | Facility: CLINIC | Age: 33
End: 2018-09-18

## 2018-09-25 ENCOUNTER — APPOINTMENT (OUTPATIENT)
Dept: OBGYN | Facility: CLINIC | Age: 33
End: 2018-09-25
Payer: MEDICAID

## 2018-09-25 VITALS
WEIGHT: 109 LBS | SYSTOLIC BLOOD PRESSURE: 109 MMHG | DIASTOLIC BLOOD PRESSURE: 73 MMHG | BODY MASS INDEX: 18.61 KG/M2 | HEIGHT: 64 IN | HEART RATE: 105 BPM

## 2018-09-25 DIAGNOSIS — N60.02 SOLITARY CYST OF LEFT BREAST: ICD-10-CM

## 2018-09-25 DIAGNOSIS — Z12.11 ENCOUNTER FOR SCREENING FOR MALIGNANT NEOPLASM OF COLON: ICD-10-CM

## 2018-09-25 PROCEDURE — 99385 PREV VISIT NEW AGE 18-39: CPT

## 2018-10-03 ENCOUNTER — OTHER (OUTPATIENT)
Age: 33
End: 2018-10-03

## 2018-10-08 ENCOUNTER — FORM ENCOUNTER (OUTPATIENT)
Age: 33
End: 2018-10-08

## 2018-10-09 ENCOUNTER — APPOINTMENT (OUTPATIENT)
Dept: ULTRASOUND IMAGING | Facility: CLINIC | Age: 33
End: 2018-10-09
Payer: COMMERCIAL

## 2018-10-09 ENCOUNTER — APPOINTMENT (OUTPATIENT)
Dept: NEPHROLOGY | Facility: CLINIC | Age: 33
End: 2018-10-09
Payer: COMMERCIAL

## 2018-10-09 ENCOUNTER — OUTPATIENT (OUTPATIENT)
Dept: OUTPATIENT SERVICES | Facility: HOSPITAL | Age: 33
LOS: 1 days | End: 2018-10-09
Payer: COMMERCIAL

## 2018-10-09 VITALS
BODY MASS INDEX: 18.61 KG/M2 | DIASTOLIC BLOOD PRESSURE: 72 MMHG | HEIGHT: 64 IN | WEIGHT: 109 LBS | SYSTOLIC BLOOD PRESSURE: 102 MMHG | HEART RATE: 91 BPM | OXYGEN SATURATION: 100 %

## 2018-10-09 VITALS — DIASTOLIC BLOOD PRESSURE: 66 MMHG | SYSTOLIC BLOOD PRESSURE: 98 MMHG

## 2018-10-09 DIAGNOSIS — N29 OTHER DISORDERS OF CALCIUM METABOLISM: ICD-10-CM

## 2018-10-09 DIAGNOSIS — Z87.59 PERSONAL HISTORY OF OTHER COMPLICATIONS OF PREGNANCY, CHILDBIRTH AND THE PUERPERIUM: Chronic | ICD-10-CM

## 2018-10-09 DIAGNOSIS — Z87.19 PERSONAL HISTORY OF OTHER DISEASES OF THE DIGESTIVE SYSTEM: ICD-10-CM

## 2018-10-09 DIAGNOSIS — E83.59 OTHER DISORDERS OF CALCIUM METABOLISM: ICD-10-CM

## 2018-10-09 DIAGNOSIS — K21.0 GASTRO-ESOPHAGEAL REFLUX DISEASE WITH ESOPHAGITIS: ICD-10-CM

## 2018-10-09 DIAGNOSIS — Z00.00 ENCOUNTER FOR GENERAL ADULT MEDICAL EXAMINATION WITHOUT ABNORMAL FINDINGS: ICD-10-CM

## 2018-10-09 PROCEDURE — 99204 OFFICE O/P NEW MOD 45 MIN: CPT

## 2018-10-09 PROCEDURE — 76641 ULTRASOUND BREAST COMPLETE: CPT

## 2018-10-09 PROCEDURE — 76641 ULTRASOUND BREAST COMPLETE: CPT | Mod: 26,50

## 2018-10-10 LAB
25(OH)D3 SERPL-MCNC: 29.1 NG/ML
ALBUMIN SERPL ELPH-MCNC: 4.6 G/DL
ANION GAP SERPL CALC-SCNC: 11 MMOL/L
APPEARANCE: CLEAR
BACTERIA: NEGATIVE
BASOPHILS # BLD AUTO: 0.03 K/UL
BASOPHILS NFR BLD AUTO: 0.6 %
BILIRUBIN URINE: NEGATIVE
BLOOD URINE: NEGATIVE
BUN SERPL-MCNC: 6 MG/DL
CALCIUM SERPL-MCNC: 9.1 MG/DL
CALCIUM SERPL-MCNC: 9.1 MG/DL
CHLORIDE SERPL-SCNC: 105 MMOL/L
CO2 SERPL-SCNC: 26 MMOL/L
COLOR: YELLOW
CREAT SERPL-MCNC: 0.64 MG/DL
CREAT SPEC-SCNC: 18 MG/DL
CREAT/PROT UR: NORMAL
EOSINOPHIL # BLD AUTO: 0.27 K/UL
EOSINOPHIL NFR BLD AUTO: 5.5 %
GLUCOSE QUALITATIVE U: NEGATIVE MG/DL
GLUCOSE SERPL-MCNC: 89 MG/DL
HCT VFR BLD CALC: 37.6 %
HGB BLD-MCNC: 11.9 G/DL
IMM GRANULOCYTES NFR BLD AUTO: 0.2 %
KETONES URINE: NEGATIVE
LEUKOCYTE ESTERASE URINE: NEGATIVE
LYMPHOCYTES # BLD AUTO: 1.81 K/UL
LYMPHOCYTES NFR BLD AUTO: 36.9 %
MAN DIFF?: NORMAL
MCHC RBC-ENTMCNC: 28.3 PG
MCHC RBC-ENTMCNC: 31.6 GM/DL
MCV RBC AUTO: 89.5 FL
MICROSCOPIC-UA: NORMAL
MONOCYTES # BLD AUTO: 0.33 K/UL
MONOCYTES NFR BLD AUTO: 6.7 %
NEUTROPHILS # BLD AUTO: 2.46 K/UL
NEUTROPHILS NFR BLD AUTO: 50.1 %
NITRITE URINE: NEGATIVE
PARATHYROID HORMONE INTACT: 29 PG/ML
PH URINE: 6.5
PHOSPHATE SERPL-MCNC: 3.8 MG/DL
PLATELET # BLD AUTO: 305 K/UL
POTASSIUM SERPL-SCNC: 4.3 MMOL/L
PROT UR-MCNC: <4 MG/DL
PROTEIN URINE: NEGATIVE MG/DL
RBC # BLD: 4.2 M/UL
RBC # FLD: 14.1 %
RED BLOOD CELLS URINE: 1 /HPF
SODIUM SERPL-SCNC: 142 MMOL/L
SPECIFIC GRAVITY URINE: 1.01
SQUAMOUS EPITHELIAL CELLS: 0 /HPF
UROBILINOGEN URINE: NEGATIVE MG/DL
WBC # FLD AUTO: 4.91 K/UL
WHITE BLOOD CELLS URINE: 0 /HPF

## 2018-10-15 LAB
C TRACH RRNA SPEC QL NAA+PROBE: NOT DETECTED
CANDIDA VAG CYTO: NOT DETECTED
CYTOLOGY CVX/VAG DOC THIN PREP: NORMAL
G VAGINALIS+PREV SP MTYP VAG QL MICRO: NOT DETECTED
HPV HIGH+LOW RISK DNA PNL CVX: NOT DETECTED
N GONORRHOEA RRNA SPEC QL NAA+PROBE: NOT DETECTED
SOURCE AMPLIFICATION: NORMAL
T VAGINALIS VAG QL WET PREP: NOT DETECTED

## 2018-10-16 ENCOUNTER — OTHER (OUTPATIENT)
Age: 33
End: 2018-10-16

## 2019-02-14 ENCOUNTER — MEDICATION RENEWAL (OUTPATIENT)
Age: 34
End: 2019-02-14

## 2019-02-14 ENCOUNTER — APPOINTMENT (OUTPATIENT)
Dept: GASTROENTEROLOGY | Facility: CLINIC | Age: 34
End: 2019-02-14

## 2019-03-04 ENCOUNTER — EMERGENCY (EMERGENCY)
Facility: HOSPITAL | Age: 34
LOS: 1 days | Discharge: HOME | End: 2019-03-04
Attending: EMERGENCY MEDICINE | Admitting: EMERGENCY MEDICINE
Payer: COMMERCIAL

## 2019-03-04 VITALS
DIASTOLIC BLOOD PRESSURE: 82 MMHG | OXYGEN SATURATION: 100 % | HEART RATE: 101 BPM | RESPIRATION RATE: 18 BRPM | TEMPERATURE: 98 F | SYSTOLIC BLOOD PRESSURE: 115 MMHG

## 2019-03-04 VITALS
SYSTOLIC BLOOD PRESSURE: 106 MMHG | HEART RATE: 91 BPM | RESPIRATION RATE: 16 BRPM | OXYGEN SATURATION: 100 % | DIASTOLIC BLOOD PRESSURE: 73 MMHG | TEMPERATURE: 98 F

## 2019-03-04 DIAGNOSIS — Z87.59 PERSONAL HISTORY OF OTHER COMPLICATIONS OF PREGNANCY, CHILDBIRTH AND THE PUERPERIUM: Chronic | ICD-10-CM

## 2019-03-04 LAB
ALBUMIN SERPL ELPH-MCNC: 4.7 G/DL — SIGNIFICANT CHANGE UP (ref 3.3–5)
ALP SERPL-CCNC: 45 U/L — SIGNIFICANT CHANGE UP (ref 40–120)
ALT FLD-CCNC: 14 U/L — SIGNIFICANT CHANGE UP (ref 4–33)
ANION GAP SERPL CALC-SCNC: 14 MMO/L — SIGNIFICANT CHANGE UP (ref 7–14)
APPEARANCE UR: CLEAR — SIGNIFICANT CHANGE UP
AST SERPL-CCNC: 22 U/L — SIGNIFICANT CHANGE UP (ref 4–32)
BASOPHILS # BLD AUTO: 0.04 K/UL — SIGNIFICANT CHANGE UP (ref 0–0.2)
BASOPHILS NFR BLD AUTO: 0.6 % — SIGNIFICANT CHANGE UP (ref 0–2)
BILIRUB SERPL-MCNC: 0.3 MG/DL — SIGNIFICANT CHANGE UP (ref 0.2–1.2)
BILIRUB UR-MCNC: NEGATIVE — SIGNIFICANT CHANGE UP
BLOOD UR QL VISUAL: NEGATIVE — SIGNIFICANT CHANGE UP
BUN SERPL-MCNC: 7 MG/DL — SIGNIFICANT CHANGE UP (ref 7–23)
CALCIUM SERPL-MCNC: 9.7 MG/DL — SIGNIFICANT CHANGE UP (ref 8.4–10.5)
CHLORIDE SERPL-SCNC: 101 MMOL/L — SIGNIFICANT CHANGE UP (ref 98–107)
CO2 SERPL-SCNC: 24 MMOL/L — SIGNIFICANT CHANGE UP (ref 22–31)
COLOR SPEC: YELLOW — SIGNIFICANT CHANGE UP
CREAT SERPL-MCNC: 0.6 MG/DL — SIGNIFICANT CHANGE UP (ref 0.5–1.3)
EOSINOPHIL # BLD AUTO: 0.05 K/UL — SIGNIFICANT CHANGE UP (ref 0–0.5)
EOSINOPHIL NFR BLD AUTO: 0.8 % — SIGNIFICANT CHANGE UP (ref 0–6)
GLUCOSE SERPL-MCNC: 90 MG/DL — SIGNIFICANT CHANGE UP (ref 70–99)
GLUCOSE UR-MCNC: NEGATIVE — SIGNIFICANT CHANGE UP
HCG UR-SCNC: NEGATIVE — SIGNIFICANT CHANGE UP
HCT VFR BLD CALC: 38 % — SIGNIFICANT CHANGE UP (ref 34.5–45)
HGB BLD-MCNC: 11.7 G/DL — SIGNIFICANT CHANGE UP (ref 11.5–15.5)
IMM GRANULOCYTES NFR BLD AUTO: 0.3 % — SIGNIFICANT CHANGE UP (ref 0–1.5)
KETONES UR-MCNC: NEGATIVE — SIGNIFICANT CHANGE UP
LEUKOCYTE ESTERASE UR-ACNC: NEGATIVE — SIGNIFICANT CHANGE UP
LYMPHOCYTES # BLD AUTO: 1.76 K/UL — SIGNIFICANT CHANGE UP (ref 1–3.3)
LYMPHOCYTES # BLD AUTO: 28.2 % — SIGNIFICANT CHANGE UP (ref 13–44)
MCHC RBC-ENTMCNC: 26.8 PG — LOW (ref 27–34)
MCHC RBC-ENTMCNC: 30.8 % — LOW (ref 32–36)
MCV RBC AUTO: 87 FL — SIGNIFICANT CHANGE UP (ref 80–100)
MONOCYTES # BLD AUTO: 0.43 K/UL — SIGNIFICANT CHANGE UP (ref 0–0.9)
MONOCYTES NFR BLD AUTO: 6.9 % — SIGNIFICANT CHANGE UP (ref 2–14)
NEUTROPHILS # BLD AUTO: 3.94 K/UL — SIGNIFICANT CHANGE UP (ref 1.8–7.4)
NEUTROPHILS NFR BLD AUTO: 63.2 % — SIGNIFICANT CHANGE UP (ref 43–77)
NITRITE UR-MCNC: NEGATIVE — SIGNIFICANT CHANGE UP
NRBC # FLD: 0 K/UL — LOW (ref 25–125)
PH UR: 7 — SIGNIFICANT CHANGE UP (ref 5–8)
PLATELET # BLD AUTO: 308 K/UL — SIGNIFICANT CHANGE UP (ref 150–400)
PMV BLD: 9.1 FL — SIGNIFICANT CHANGE UP (ref 7–13)
POTASSIUM SERPL-MCNC: 4.4 MMOL/L — SIGNIFICANT CHANGE UP (ref 3.5–5.3)
POTASSIUM SERPL-SCNC: 4.4 MMOL/L — SIGNIFICANT CHANGE UP (ref 3.5–5.3)
PROT SERPL-MCNC: 8 G/DL — SIGNIFICANT CHANGE UP (ref 6–8.3)
PROT UR-MCNC: NEGATIVE — SIGNIFICANT CHANGE UP
RBC # BLD: 4.37 M/UL — SIGNIFICANT CHANGE UP (ref 3.8–5.2)
RBC # FLD: 14.5 % — SIGNIFICANT CHANGE UP (ref 10.3–14.5)
SODIUM SERPL-SCNC: 139 MMOL/L — SIGNIFICANT CHANGE UP (ref 135–145)
SP GR SPEC: 1.01 — SIGNIFICANT CHANGE UP (ref 1–1.04)
UROBILINOGEN FLD QL: NORMAL — SIGNIFICANT CHANGE UP
WBC # BLD: 6.24 K/UL — SIGNIFICANT CHANGE UP (ref 3.8–10.5)
WBC # FLD AUTO: 6.24 K/UL — SIGNIFICANT CHANGE UP (ref 3.8–10.5)

## 2019-03-04 PROCEDURE — 74176 CT ABD & PELVIS W/O CONTRAST: CPT | Mod: 26

## 2019-03-04 PROCEDURE — 99284 EMERGENCY DEPT VISIT MOD MDM: CPT

## 2019-03-04 RX ORDER — SODIUM CHLORIDE 9 MG/ML
3 INJECTION INTRAMUSCULAR; INTRAVENOUS; SUBCUTANEOUS ONCE
Refills: 0 | Status: COMPLETED | OUTPATIENT
Start: 2019-03-04 | End: 2019-03-04

## 2019-03-04 RX ORDER — KETOROLAC TROMETHAMINE 30 MG/ML
15 SYRINGE (ML) INJECTION ONCE
Refills: 0 | Status: DISCONTINUED | OUTPATIENT
Start: 2019-03-04 | End: 2019-03-04

## 2019-03-04 RX ORDER — SODIUM CHLORIDE 9 MG/ML
1000 INJECTION INTRAMUSCULAR; INTRAVENOUS; SUBCUTANEOUS ONCE
Refills: 0 | Status: COMPLETED | OUTPATIENT
Start: 2019-03-04 | End: 2019-03-04

## 2019-03-04 RX ADMIN — Medication 15 MILLIGRAM(S): at 16:08

## 2019-03-04 RX ADMIN — SODIUM CHLORIDE 3 MILLILITER(S): 9 INJECTION INTRAMUSCULAR; INTRAVENOUS; SUBCUTANEOUS at 16:09

## 2019-03-04 RX ADMIN — SODIUM CHLORIDE 1000 MILLILITER(S): 9 INJECTION INTRAMUSCULAR; INTRAVENOUS; SUBCUTANEOUS at 16:10

## 2019-03-04 NOTE — ED ADULT NURSE REASSESSMENT NOTE - SYMPTOMS
pt states pain is traveling. c.o. pain lt shoulder, lt arm. , lt side and lt flank. states "do I need a cardiogram" concerned because father has cardiac hx.

## 2019-03-04 NOTE — ED PROVIDER NOTE - OBJECTIVE STATEMENT
severe, intermittent L flank and LLQ pain, intermittent since yesterday, denies fevers, chills, no n/v or dysuria. pain not worse w movement.

## 2019-03-04 NOTE — ED ADULT TRIAGE NOTE - CHIEF COMPLAINT QUOTE
Co left flank pain since yesterday. Co nausea, denies vomiting. Denies dysuria or hematuria. LMP 2/20/19.

## 2019-03-04 NOTE — ED PROVIDER NOTE - PROGRESS NOTE DETAILS
Sims: s/o from Dr Avendaño- to f/u labs and ct.  work up neg with stable lung nodule.  ua clean. no stone.  pt asx.  requested ekg.  ekg unremarkable.  dx left flank pain likely renal colic.  motrin for pain. left ambulatory.  return precautions given.

## 2019-03-04 NOTE — ED ADULT NURSE REASSESSMENT NOTE - REASSESS COMMUNICATION
sl removed intact, ambulatory, family here with patient
ED physician notified/vs as documented. EKG requested

## 2019-03-11 ENCOUNTER — OUTPATIENT (OUTPATIENT)
Dept: OUTPATIENT SERVICES | Facility: HOSPITAL | Age: 34
LOS: 1 days | Discharge: HOME | End: 2019-03-11
Payer: COMMERCIAL

## 2019-03-11 ENCOUNTER — RESULT REVIEW (OUTPATIENT)
Age: 34
End: 2019-03-11

## 2019-03-11 ENCOUNTER — APPOINTMENT (OUTPATIENT)
Dept: GASTROENTEROLOGY | Facility: HOSPITAL | Age: 34
End: 2019-03-11

## 2019-03-11 DIAGNOSIS — K21.9 GASTRO-ESOPHAGEAL REFLUX DISEASE WITHOUT ESOPHAGITIS: ICD-10-CM

## 2019-03-11 DIAGNOSIS — Z87.59 PERSONAL HISTORY OF OTHER COMPLICATIONS OF PREGNANCY, CHILDBIRTH AND THE PUERPERIUM: Chronic | ICD-10-CM

## 2019-03-11 LAB — HCG UR QL: NEGATIVE — SIGNIFICANT CHANGE UP

## 2019-03-11 PROCEDURE — 43239 EGD BIOPSY SINGLE/MULTIPLE: CPT

## 2019-03-11 PROCEDURE — 88305 TISSUE EXAM BY PATHOLOGIST: CPT | Mod: 26

## 2019-03-11 PROCEDURE — 45378 DIAGNOSTIC COLONOSCOPY: CPT

## 2019-03-11 PROCEDURE — 88312 SPECIAL STAINS GROUP 1: CPT | Mod: 26

## 2019-03-14 LAB — SURGICAL PATHOLOGY STUDY: SIGNIFICANT CHANGE UP

## 2019-03-19 NOTE — ED PROVIDER NOTE - NS ED MD DISPO DISCHARGE
[No studies available for review at this time.] : No studies available for review at this time.
Home

## 2019-07-03 ENCOUNTER — APPOINTMENT (OUTPATIENT)
Dept: OBGYN | Facility: CLINIC | Age: 34
End: 2019-07-03
Payer: COMMERCIAL

## 2019-07-03 ENCOUNTER — ASOB RESULT (OUTPATIENT)
Age: 34
End: 2019-07-03

## 2019-07-03 VITALS
HEART RATE: 85 BPM | DIASTOLIC BLOOD PRESSURE: 75 MMHG | SYSTOLIC BLOOD PRESSURE: 108 MMHG | BODY MASS INDEX: 18.44 KG/M2 | WEIGHT: 108 LBS | HEIGHT: 64 IN

## 2019-07-03 DIAGNOSIS — Z33.2 ENCOUNTER FOR ELECTIVE TERMINATION OF PREGNANCY: ICD-10-CM

## 2019-07-03 PROCEDURE — 76817 TRANSVAGINAL US OBSTETRIC: CPT

## 2019-07-03 PROCEDURE — 99215 OFFICE O/P EST HI 40 MIN: CPT

## 2019-07-04 PROBLEM — Z33.2 MEDICAL ABORTION: Status: RESOLVED | Noted: 2019-07-04 | Resolved: 2019-07-04

## 2019-07-04 LAB
25(OH)D3 SERPL-MCNC: 34.8 NG/ML
ALBUMIN SERPL ELPH-MCNC: 4.7 G/DL
ALP BLD-CCNC: 52 U/L
ALT SERPL-CCNC: 18 U/L
ANION GAP SERPL CALC-SCNC: 18 MMOL/L
AST SERPL-CCNC: 19 U/L
BASOPHILS # BLD AUTO: 0.03 K/UL
BASOPHILS NFR BLD AUTO: 0.3 %
BILIRUB SERPL-MCNC: 0.3 MG/DL
BUN SERPL-MCNC: 9 MG/DL
C TRACH RRNA SPEC QL NAA+PROBE: NOT DETECTED
CALCIUM SERPL-MCNC: 10.4 MG/DL
CHLORIDE SERPL-SCNC: 105 MMOL/L
CO2 SERPL-SCNC: 20 MMOL/L
CREAT SERPL-MCNC: 0.57 MG/DL
EOSINOPHIL # BLD AUTO: 0.12 K/UL
EOSINOPHIL NFR BLD AUTO: 1.3 %
GLUCOSE SERPL-MCNC: 80 MG/DL
HBV SURFACE AG SER QL: NONREACTIVE
HCT VFR BLD CALC: 41 %
HCV AB SER QL: NONREACTIVE
HCV S/CO RATIO: 0.17 S/CO
HGB BLD-MCNC: 12.2 G/DL
HIV1+2 AB SPEC QL IA.RAPID: NONREACTIVE
IMM GRANULOCYTES NFR BLD AUTO: 0.3 %
LYMPHOCYTES # BLD AUTO: 1.92 K/UL
LYMPHOCYTES NFR BLD AUTO: 20.4 %
MAN DIFF?: NORMAL
MCHC RBC-ENTMCNC: 27.2 PG
MCHC RBC-ENTMCNC: 29.8 GM/DL
MCV RBC AUTO: 91.5 FL
MEV IGG FLD QL IA: >300 AU/ML
MEV IGG+IGM SER-IMP: POSITIVE
MONOCYTES # BLD AUTO: 0.63 K/UL
MONOCYTES NFR BLD AUTO: 6.7 %
N GONORRHOEA RRNA SPEC QL NAA+PROBE: NOT DETECTED
NEUTROPHILS # BLD AUTO: 6.66 K/UL
NEUTROPHILS NFR BLD AUTO: 71 %
PLATELET # BLD AUTO: 342 K/UL
POTASSIUM SERPL-SCNC: 4.6 MMOL/L
PROT SERPL-MCNC: 7.9 G/DL
RBC # BLD: 4.48 M/UL
RBC # FLD: 14.9 %
RUBV IGG FLD-ACNC: 10.2 INDEX
RUBV IGG SER-IMP: POSITIVE
SODIUM SERPL-SCNC: 143 MMOL/L
SOURCE AMPLIFICATION: NORMAL
T GONDII AB SER-IMP: NEGATIVE
T GONDII AB SER-IMP: NEGATIVE
T GONDII IGG SER QL: <3 IU/ML
T GONDII IGM SER QL: <3 AU/ML
T PALLIDUM AB SER QL IA: NEGATIVE
TSH SERPL-ACNC: 3.87 UIU/ML
VIT B12 SERPL-MCNC: 641 PG/ML
VZV AB TITR SER: POSITIVE
VZV IGG SER IF-ACNC: 2220 INDEX
WBC # FLD AUTO: 9.39 K/UL

## 2019-07-04 RX ORDER — SODIUM PICOSULFATE, MAGNESIUM OXIDE, AND ANHYDROUS CITRIC ACID 10; 3.5; 12 MG/16.2G; G/16.2G; G/16.2G
10-3.5-12 POWDER, METERED ORAL
Qty: 1 | Refills: 0 | Status: DISCONTINUED | COMMUNITY
Start: 2018-07-18 | End: 2019-07-04

## 2019-07-04 NOTE — CHIEF COMPLAINT
[Follow Up] : follow up GYN visit [FreeTextEntry1] : Patient presented to confirm pregnancy. Is highly anxious because of her health. has h/o palpitations and episodes of tachycardia. Patient stated was admitted in 2016 for 2 weeks for this but on review of records was admitted for 2-3 days and had full workup and was sent home on metroprolol. She f/u with the cardiologist in 2016 and has not seem him since and has not taken the medication. Patient feels should terminate the pregnancy because of her medical condition.\par Explained that it is a treatable condition and need to be compliant and occ SVT is not a reason to terminate.

## 2019-07-04 NOTE — COUNSELING
[Nutrition] : nutrition [Breast Self Exam] : breast self exam [Pregnancy Options] : pregnancy options [Exercise] : exercise [HIV Pretest] : HIV pretest [Vitamins/Supplements] : vitamins/supplements [Medication Management] : medication management [Family Involvement] : family involvement [Drugs/Alcohol] : drugs/alcohol

## 2019-07-04 NOTE — PHYSICAL EXAM
[Alert] : alert [Acute Distress] : no acute distress [Awake] : awake [LAD] : no lymphadenopathy [Mass] : no breast mass [Thyroid Nodule] : no thyroid nodule [Goiter] : no goiter [Nipple Discharge] : no nipple discharge [Axillary LAD] : no axillary lymphadenopathy [Oriented x3] : oriented to person, place, and time [Soft] : soft [Tender] : non tender [No Bleeding] : there was no active vaginal bleeding [Normal] : uterus [Uterine Adnexae] : were not tender and not enlarged [CTAB] : CTAB [RRR, No Murmurs] : RRR, no murmurs

## 2019-07-09 ENCOUNTER — OTHER (OUTPATIENT)
Age: 34
End: 2019-07-09

## 2019-07-16 ENCOUNTER — LABORATORY RESULT (OUTPATIENT)
Age: 34
End: 2019-07-16

## 2019-07-16 ENCOUNTER — APPOINTMENT (OUTPATIENT)
Dept: INTERNAL MEDICINE | Facility: CLINIC | Age: 34
End: 2019-07-16
Payer: COMMERCIAL

## 2019-07-16 PROCEDURE — 36415 COLL VENOUS BLD VENIPUNCTURE: CPT

## 2019-07-17 ENCOUNTER — RESULT REVIEW (OUTPATIENT)
Age: 34
End: 2019-07-17

## 2019-07-19 LAB
FERRITIN SERPL-MCNC: 29 NG/ML
FOLATE SERPL-MCNC: >20 NG/ML
IRON SATN MFR SERPL: 28 %
IRON SERPL-MCNC: 80 UG/DL
TIBC SERPL-MCNC: 284 UG/DL
UIBC SERPL-MCNC: 204 UG/DL
VIT B12 SERPL-MCNC: 534 PG/ML

## 2019-07-22 ENCOUNTER — EMERGENCY (EMERGENCY)
Facility: HOSPITAL | Age: 34
LOS: 1 days | Discharge: HOME | End: 2019-07-22
Admitting: EMERGENCY MEDICINE
Payer: COMMERCIAL

## 2019-07-22 VITALS
TEMPERATURE: 97 F | SYSTOLIC BLOOD PRESSURE: 113 MMHG | HEART RATE: 75 BPM | OXYGEN SATURATION: 100 % | DIASTOLIC BLOOD PRESSURE: 59 MMHG | RESPIRATION RATE: 18 BRPM

## 2019-07-22 VITALS
RESPIRATION RATE: 20 BRPM | SYSTOLIC BLOOD PRESSURE: 127 MMHG | DIASTOLIC BLOOD PRESSURE: 80 MMHG | TEMPERATURE: 98 F | HEART RATE: 108 BPM | OXYGEN SATURATION: 100 %

## 2019-07-22 DIAGNOSIS — Z87.59 PERSONAL HISTORY OF OTHER COMPLICATIONS OF PREGNANCY, CHILDBIRTH AND THE PUERPERIUM: Chronic | ICD-10-CM

## 2019-07-22 LAB
ABO + RH PNL BLD: NORMAL
ALBUMIN SERPL ELPH-MCNC: 4.2 G/DL — SIGNIFICANT CHANGE UP (ref 3.3–5)
ALP SERPL-CCNC: 38 U/L — LOW (ref 40–120)
ALT FLD-CCNC: 16 U/L — SIGNIFICANT CHANGE UP (ref 4–33)
ANION GAP SERPL CALC-SCNC: 12 MMO/L — SIGNIFICANT CHANGE UP (ref 7–14)
APPEARANCE UR: CLEAR — SIGNIFICANT CHANGE UP
AR GENE MUT ANL BLD/T: NEGATIVE
AST SERPL-CCNC: 17 U/L — SIGNIFICANT CHANGE UP (ref 4–32)
B19V IGG SER QL IA: 0.3 INDEX
B19V IGG+IGM SER-IMP: NEGATIVE
B19V IGG+IGM SER-IMP: NORMAL
B19V IGM FLD-ACNC: 0.2
B19V IGM SER-ACNC: NEGATIVE
BACTERIA UR CULT: NORMAL
BASOPHILS # BLD AUTO: 0.03 K/UL — SIGNIFICANT CHANGE UP (ref 0–0.2)
BASOPHILS NFR BLD AUTO: 0.3 % — SIGNIFICANT CHANGE UP (ref 0–2)
BILIRUB SERPL-MCNC: 0.3 MG/DL — SIGNIFICANT CHANGE UP (ref 0.2–1.2)
BILIRUB UR-MCNC: NEGATIVE — SIGNIFICANT CHANGE UP
BLD GP AB SCN SERPL QL: NEGATIVE — SIGNIFICANT CHANGE UP
BLD GP AB SCN SERPL QL: NORMAL
BLOOD UR QL VISUAL: SIGNIFICANT CHANGE UP
BUN SERPL-MCNC: 9 MG/DL — SIGNIFICANT CHANGE UP (ref 7–23)
CALCIUM SERPL-MCNC: 9.6 MG/DL — SIGNIFICANT CHANGE UP (ref 8.4–10.5)
CFTR MUT TESTED BLD/T: NEGATIVE
CHLORIDE SERPL-SCNC: 102 MMOL/L — SIGNIFICANT CHANGE UP (ref 98–107)
CO2 SERPL-SCNC: 23 MMOL/L — SIGNIFICANT CHANGE UP (ref 22–31)
COLOR SPEC: COLORLESS — SIGNIFICANT CHANGE UP
CREAT SERPL-MCNC: 0.64 MG/DL — SIGNIFICANT CHANGE UP (ref 0.5–1.3)
EOSINOPHIL # BLD AUTO: 0.08 K/UL — SIGNIFICANT CHANGE UP (ref 0–0.5)
EOSINOPHIL NFR BLD AUTO: 0.8 % — SIGNIFICANT CHANGE UP (ref 0–6)
FMR1 GENE MUT ANL BLD/T: NORMAL
GLUCOSE SERPL-MCNC: 86 MG/DL — SIGNIFICANT CHANGE UP (ref 70–99)
GLUCOSE UR-MCNC: NEGATIVE — SIGNIFICANT CHANGE UP
HCG SERPL-ACNC: SIGNIFICANT CHANGE UP MIU/ML
HCT VFR BLD CALC: 34.4 % — LOW (ref 34.5–45)
HGB A MFR BLD: 97.5 %
HGB A2 MFR BLD: 2.5 %
HGB BLD-MCNC: 10.9 G/DL — LOW (ref 11.5–15.5)
HGB FRACT BLD-IMP: NORMAL
IMM GRANULOCYTES NFR BLD AUTO: 0.3 % — SIGNIFICANT CHANGE UP (ref 0–1.5)
KETONES UR-MCNC: NEGATIVE — SIGNIFICANT CHANGE UP
LEAD BLD-MCNC: <1 UG/DL
LEUKOCYTE ESTERASE UR-ACNC: NEGATIVE — SIGNIFICANT CHANGE UP
LYMPHOCYTES # BLD AUTO: 1.63 K/UL — SIGNIFICANT CHANGE UP (ref 1–3.3)
LYMPHOCYTES # BLD AUTO: 17.2 % — SIGNIFICANT CHANGE UP (ref 13–44)
MCHC RBC-ENTMCNC: 27.8 PG — SIGNIFICANT CHANGE UP (ref 27–34)
MCHC RBC-ENTMCNC: 31.7 % — LOW (ref 32–36)
MCV RBC AUTO: 87.8 FL — SIGNIFICANT CHANGE UP (ref 80–100)
MONOCYTES # BLD AUTO: 0.71 K/UL — SIGNIFICANT CHANGE UP (ref 0–0.9)
MONOCYTES NFR BLD AUTO: 7.5 % — SIGNIFICANT CHANGE UP (ref 2–14)
NEUTROPHILS # BLD AUTO: 7 K/UL — SIGNIFICANT CHANGE UP (ref 1.8–7.4)
NEUTROPHILS NFR BLD AUTO: 73.9 % — SIGNIFICANT CHANGE UP (ref 43–77)
NITRITE UR-MCNC: NEGATIVE — SIGNIFICANT CHANGE UP
NRBC # FLD: 0 K/UL — SIGNIFICANT CHANGE UP (ref 0–0)
PH UR: 6.5 — SIGNIFICANT CHANGE UP (ref 5–8)
PLATELET # BLD AUTO: 296 K/UL — SIGNIFICANT CHANGE UP (ref 150–400)
PMV BLD: 8.8 FL — SIGNIFICANT CHANGE UP (ref 7–13)
POTASSIUM SERPL-MCNC: 3.9 MMOL/L — SIGNIFICANT CHANGE UP (ref 3.5–5.3)
POTASSIUM SERPL-SCNC: 3.9 MMOL/L — SIGNIFICANT CHANGE UP (ref 3.5–5.3)
PROT SERPL-MCNC: 7.3 G/DL — SIGNIFICANT CHANGE UP (ref 6–8.3)
PROT UR-MCNC: NEGATIVE — SIGNIFICANT CHANGE UP
RBC # BLD: 3.92 M/UL — SIGNIFICANT CHANGE UP (ref 3.8–5.2)
RBC # FLD: 15.2 % — HIGH (ref 10.3–14.5)
RH IG SCN BLD-IMP: POSITIVE — SIGNIFICANT CHANGE UP
SODIUM SERPL-SCNC: 137 MMOL/L — SIGNIFICANT CHANGE UP (ref 135–145)
SP GR SPEC: 1 — SIGNIFICANT CHANGE UP (ref 1–1.04)
UROBILINOGEN FLD QL: NORMAL — SIGNIFICANT CHANGE UP
WBC # BLD: 9.48 K/UL — SIGNIFICANT CHANGE UP (ref 3.8–10.5)
WBC # FLD AUTO: 9.48 K/UL — SIGNIFICANT CHANGE UP (ref 3.8–10.5)

## 2019-07-22 PROCEDURE — 99285 EMERGENCY DEPT VISIT HI MDM: CPT

## 2019-07-22 PROCEDURE — 76817 TRANSVAGINAL US OBSTETRIC: CPT | Mod: 26

## 2019-07-22 NOTE — ED PROVIDER NOTE - CLINICAL SUMMARY MEDICAL DECISION MAKING FREE TEXT BOX
32 y/o female , PMHx of Dysautonomia, pregnant at 8 weeks gestation presenting with vaginal bleeding. Pt is hemodynamically stable and well appearing. Non acute abdomen. Os is closed. Impression vaginal bleeding early pregnancy. Will obtain US to check fetal viability as well as labs including Beta HCG and UA. Monitor and Reassess. 34 y/o female , pregnant at 8 weeks gestation presenting with vaginal spotting since yesterday. Pt is hemodynamically stable and well appearing. Non-acute abdomen; Os is closed. Impression: Vaginal bleeding in early pregnancy, known documented IUP. Will obtain US to check fetal viability as well as labs including Beta HCG and UA. Monitor and Reassess.

## 2019-07-22 NOTE — ED PROVIDER NOTE - OBJECTIVE STATEMENT
34 y/o female with a PMHx of Dysautonomia, a PSHx of , , P5 2019 LMP MAY 20th 2019 presents to the ED with brown discharge and vaginal red blood since yesterday. Pt is at 8 weeks gestation. Pt complains of cramping. Pt denies nausea, vomiting, chest pain, shortness of breath, fevers, or chills. Pt had ultrasound with positive IUP. Of note, pt's OB is Dr. Sanchez. No other acute complaints at time of eval. 34 y/o female with no known PMH, , LMP 19, presents to the ED with brown discharge sine yesterday; and states this morning she noted vaginal spotting without notable blood clots, but no active bleeding at this time. Pt further complains of pelvic cramping. Pt denies nausea, vomiting, chest pain, shortness of breath, fevers, or chills. Pt has a documented ultrasound with +IUP. Of note, pt's OB is Dr. Sanchez. No other acute complaints at time of eval.

## 2019-07-22 NOTE — ED PROVIDER NOTE - PROGRESS NOTE DETAILS
KUNAL Cooley: Labe reviewed; Rh positive. TVUS: Single viable intrauterine pregnancy. Estimated gestational age of 9 weeks. Pt is clinically stable for discharge. PMD follow. Return precautions.

## 2019-07-22 NOTE — ED PROVIDER NOTE - GENITOURINARY [+], MLM
vaginal brown discharge, red blood. Cramping pain. vaginal brown discharge, vaginal bleeding. Cramping pain.

## 2019-07-22 NOTE — ED PROVIDER NOTE - NSFOLLOWUPINSTRUCTIONS_ED_ALL_ED_FT
Please follow up with obstetric doctor in 2 days.  Pelvic rest is recommended.  If you have any new, worsened, or concerning symptoms including but not limited to vaginal bleeding, pelvic pain, fever, chills, chest pain, dyspnea, please return to the emergency department immediately.

## 2019-07-22 NOTE — ED PROVIDER NOTE - GENITOURINARY EXTERNAL GENERAL. FEMALE
As closed, brown discharge noted paracervical. No bright red blood in vaginal vault. No adnexal tenderness or CMT tenderness. Os closed, brown discharge noted cervical. No bright red blood in vaginal vault. No adnexal tenderness or CMT tenderness.

## 2019-07-22 NOTE — ED ADULT TRIAGE NOTE - CHIEF COMPLAINT QUOTE
8 weeks pregnant, c/o lower abdominal pain and vaginal spotting started yesterday, today bleeding got a little heavier

## 2019-07-24 ENCOUNTER — APPOINTMENT (OUTPATIENT)
Dept: INTERNAL MEDICINE | Facility: CLINIC | Age: 34
End: 2019-07-24
Payer: COMMERCIAL

## 2019-07-24 VITALS
OXYGEN SATURATION: 99 % | DIASTOLIC BLOOD PRESSURE: 64 MMHG | SYSTOLIC BLOOD PRESSURE: 98 MMHG | TEMPERATURE: 98.3 F | BODY MASS INDEX: 18.95 KG/M2 | WEIGHT: 111 LBS | HEIGHT: 64 IN | HEART RATE: 82 BPM

## 2019-07-24 DIAGNOSIS — Z01.419 ENCOUNTER FOR GYNECOLOGICAL EXAMINATION (GENERAL) (ROUTINE) W/OUT ABNORMAL FINDINGS: ICD-10-CM

## 2019-07-24 DIAGNOSIS — Z87.898 PERSONAL HISTORY OF OTHER SPECIFIED CONDITIONS: ICD-10-CM

## 2019-07-24 DIAGNOSIS — E73.9 LACTOSE INTOLERANCE, UNSPECIFIED: ICD-10-CM

## 2019-07-24 DIAGNOSIS — R10.10 UPPER ABDOMINAL PAIN, UNSPECIFIED: ICD-10-CM

## 2019-07-24 DIAGNOSIS — O36.80X0 PREGNANCY WITH INCONCLUSIVE FETAL VIABILITY, NOT APPLICABLE OR UNSPECIFIED: ICD-10-CM

## 2019-07-24 DIAGNOSIS — N76.0 ACUTE VAGINITIS: ICD-10-CM

## 2019-07-24 DIAGNOSIS — Z86.59 PERSONAL HISTORY OF OTHER MENTAL AND BEHAVIORAL DISORDERS: ICD-10-CM

## 2019-07-24 DIAGNOSIS — Z00.00 ENCOUNTER FOR GENERAL ADULT MEDICAL EXAMINATION W/OUT ABNORMAL FINDINGS: ICD-10-CM

## 2019-07-24 DIAGNOSIS — R20.0 ANESTHESIA OF SKIN: ICD-10-CM

## 2019-07-24 PROCEDURE — 99395 PREV VISIT EST AGE 18-39: CPT | Mod: 25

## 2019-07-24 PROCEDURE — 93000 ELECTROCARDIOGRAM COMPLETE: CPT

## 2019-07-24 RX ORDER — POLYETHYLENE GLYCOL 3350 AND ELECTROLYTES WITH LEMON FLAVOR 236; 22.74; 6.74; 5.86; 2.97 G/4L; G/4L; G/4L; G/4L; G/4L
236 POWDER, FOR SOLUTION ORAL
Qty: 1 | Refills: 0 | Status: DISCONTINUED | COMMUNITY
Start: 2019-02-14 | End: 2019-07-24

## 2019-07-24 RX ORDER — POLYETHYLENE GLYCOL 3350 AND ELECTROLYTES WITH LEMON FLAVOR 236; 22.74; 6.74; 5.86; 2.97 G/4L; G/4L; G/4L; G/4L; G/4L
236 POWDER, FOR SOLUTION ORAL
Qty: 1 | Refills: 0 | Status: DISCONTINUED | COMMUNITY
Start: 2018-08-17 | End: 2019-07-24

## 2019-07-24 RX ORDER — PANTOPRAZOLE 40 MG/1
40 TABLET, DELAYED RELEASE ORAL DAILY
Qty: 30 | Refills: 5 | Status: DISCONTINUED | COMMUNITY
Start: 2018-07-18 | End: 2019-07-24

## 2019-07-24 RX ORDER — SERTRALINE 25 MG/1
25 TABLET, FILM COATED ORAL
Qty: 30 | Refills: 3 | Status: DISCONTINUED | COMMUNITY
Start: 2018-08-03 | End: 2019-07-24

## 2019-07-24 NOTE — PHYSICAL EXAM
[No Acute Distress] : no acute distress [Well Nourished] : well nourished [Well Developed] : well developed [Well-Appearing] : well-appearing [Normal Sclera/Conjunctiva] : normal sclera/conjunctiva [PERRL] : pupils equal round and reactive to light [Normal Outer Ear/Nose] : the outer ears and nose were normal in appearance [Normal Oropharynx] : the oropharynx was normal [Normal TMs] : both tympanic membranes were normal [No Lymphadenopathy] : no lymphadenopathy [Supple] : supple [Thyroid Normal, No Nodules] : the thyroid was normal and there were no nodules present [No Accessory Muscle Use] : no accessory muscle use [No Respiratory Distress] : no respiratory distress  [Clear to Auscultation] : lungs were clear to auscultation bilaterally [Normal Rate] : normal rate  [Regular Rhythm] : with a regular rhythm [Normal S1, S2] : normal S1 and S2 [No Murmur] : no murmur heard [No Edema] : there was no peripheral edema [Soft] : abdomen soft [Non Tender] : non-tender [Non-distended] : non-distended [No Masses] : no abdominal mass palpated [Normal Bowel Sounds] : normal bowel sounds [Normal Supraclavicular Nodes] : no supraclavicular lymphadenopathy [Normal Axillary Nodes] : no axillary lymphadenopathy [Normal Posterior Cervical Nodes] : no posterior cervical lymphadenopathy [Normal Anterior Cervical Nodes] : no anterior cervical lymphadenopathy [Normal Inguinal Nodes] : no inguinal lymphadenopathy [Grossly Normal Strength/Tone] : grossly normal strength/tone [No Focal Deficits] : no focal deficits [Normal Gait] : normal gait [Normal Affect] : the affect was normal [Normal Insight/Judgement] : insight and judgment were intact [de-identified] : MS 5/5 UExt.  good ROM of b/l shoulder no pain

## 2019-07-24 NOTE — HISTORY OF PRESENT ILLNESS
[FreeTextEntry1] : CPE [de-identified] : reviewed labs w pt- mild anemia- pt is taking pre pineda MVI\par diet- healthy\par exercise-no\par \par pt is 9 wk preg- pt was evaluated at [facility] ER for vaginal bleeding.\par CP, palpitation, dyspnea- no chg since 2016- pt was seen by Cardion- 2016- had nl stress test, Echo- minimal MR\par 2017- CT angio- no PE. 3mm pulm nodule\par CP is on rest , exertion.  no always assoc w dypnea, palpitation.  CP can occurs after eating cream.  freq- 3-4 times/ mo.  duration a few minutes.  deep breathing helps w CP .\par palpitation, dyspnea- more when stressed.  occurs 3 times/wk. no alleviating or aggravating factors\par occasional anxiety- pt never took the sertraline\par  \par pt gets lower abd pain w milk- no diarrhea or bloating\par numbness on L side of chest- when driving- no shoulder pain.-since preg- has f/u w OB  5 days.\par \par anxiety is controlled w deep breathing- palpitation also improved

## 2019-07-24 NOTE — HEALTH RISK ASSESSMENT
[Yes] : Yes [Never (0 pts)] : Never (0 points) [No] : In the past 12 months have you used drugs other than those required for medical reasons? No [0] : 1) Little interest or pleasure doing things: Not at all (0) [Patient reported PAP Smear was normal] : Patient reported PAP Smear was normal [] : No [Audit-CScore] : 0 [de-identified] : last seen dentist- 1 yr [PapSmearDate] : 03/19

## 2019-07-29 ENCOUNTER — APPOINTMENT (OUTPATIENT)
Dept: OBGYN | Facility: CLINIC | Age: 34
End: 2019-07-29
Payer: COMMERCIAL

## 2019-07-29 VITALS
SYSTOLIC BLOOD PRESSURE: 102 MMHG | WEIGHT: 111.19 LBS | HEIGHT: 64 IN | DIASTOLIC BLOOD PRESSURE: 65 MMHG | BODY MASS INDEX: 18.98 KG/M2

## 2019-07-29 PROCEDURE — 0501F PRENATAL FLOW SHEET: CPT

## 2019-08-01 ENCOUNTER — ASOB RESULT (OUTPATIENT)
Age: 34
End: 2019-08-01

## 2019-08-01 ENCOUNTER — APPOINTMENT (OUTPATIENT)
Dept: OBGYN | Facility: CLINIC | Age: 34
End: 2019-08-01
Payer: COMMERCIAL

## 2019-08-01 VITALS
BODY MASS INDEX: 18.95 KG/M2 | WEIGHT: 111 LBS | DIASTOLIC BLOOD PRESSURE: 69 MMHG | SYSTOLIC BLOOD PRESSURE: 104 MMHG | HEIGHT: 64 IN

## 2019-08-01 PROCEDURE — 0502F SUBSEQUENT PRENATAL CARE: CPT

## 2019-08-01 PROCEDURE — 76801 OB US < 14 WKS SINGLE FETUS: CPT

## 2019-08-02 ENCOUNTER — APPOINTMENT (OUTPATIENT)
Dept: ANTEPARTUM | Facility: CLINIC | Age: 34
End: 2019-08-02

## 2019-08-05 ENCOUNTER — OTHER (OUTPATIENT)
Age: 34
End: 2019-08-05

## 2019-08-07 ENCOUNTER — NON-APPOINTMENT (OUTPATIENT)
Age: 34
End: 2019-08-07

## 2019-08-16 ENCOUNTER — LABORATORY RESULT (OUTPATIENT)
Age: 34
End: 2019-08-16

## 2019-08-16 ENCOUNTER — ASOB RESULT (OUTPATIENT)
Age: 34
End: 2019-08-16

## 2019-08-16 ENCOUNTER — APPOINTMENT (OUTPATIENT)
Dept: ANTEPARTUM | Facility: CLINIC | Age: 34
End: 2019-08-16
Payer: COMMERCIAL

## 2019-08-16 PROCEDURE — 76801 OB US < 14 WKS SINGLE FETUS: CPT

## 2019-08-16 PROCEDURE — 36416 COLLJ CAPILLARY BLOOD SPEC: CPT

## 2019-08-16 PROCEDURE — 76813 OB US NUCHAL MEAS 1 GEST: CPT

## 2019-08-29 ENCOUNTER — APPOINTMENT (OUTPATIENT)
Dept: OBGYN | Facility: CLINIC | Age: 34
End: 2019-08-29

## 2019-08-29 ENCOUNTER — RESULT REVIEW (OUTPATIENT)
Age: 34
End: 2019-08-29

## 2019-09-04 ENCOUNTER — APPOINTMENT (OUTPATIENT)
Dept: OBGYN | Facility: CLINIC | Age: 34
End: 2019-09-04
Payer: COMMERCIAL

## 2019-09-04 VITALS
DIASTOLIC BLOOD PRESSURE: 63 MMHG | HEIGHT: 64 IN | SYSTOLIC BLOOD PRESSURE: 92 MMHG | WEIGHT: 114.5 LBS | BODY MASS INDEX: 19.55 KG/M2

## 2019-09-04 PROCEDURE — 0502F SUBSEQUENT PRENATAL CARE: CPT

## 2019-09-10 ENCOUNTER — OTHER (OUTPATIENT)
Age: 34
End: 2019-09-10

## 2019-09-10 LAB
2ND TRIMESTER DATA: NORMAL
AFP PNL SERPL: NORMAL
AFP SERPL-ACNC: NORMAL
CLINICAL BIOCHEMIST REVIEW: NORMAL
NOTES NTD: NORMAL

## 2019-09-23 ENCOUNTER — NON-APPOINTMENT (OUTPATIENT)
Age: 34
End: 2019-09-23

## 2019-09-23 ENCOUNTER — APPOINTMENT (OUTPATIENT)
Dept: OBGYN | Facility: CLINIC | Age: 34
End: 2019-09-23
Payer: COMMERCIAL

## 2019-09-23 VITALS
HEIGHT: 64 IN | SYSTOLIC BLOOD PRESSURE: 104 MMHG | WEIGHT: 121 LBS | BODY MASS INDEX: 20.66 KG/M2 | DIASTOLIC BLOOD PRESSURE: 70 MMHG

## 2019-09-23 PROCEDURE — 0502F SUBSEQUENT PRENATAL CARE: CPT

## 2019-10-07 ENCOUNTER — TRANSCRIPTION ENCOUNTER (OUTPATIENT)
Age: 34
End: 2019-10-07

## 2019-10-09 ENCOUNTER — APPOINTMENT (OUTPATIENT)
Dept: ANTEPARTUM | Facility: CLINIC | Age: 34
End: 2019-10-09
Payer: COMMERCIAL

## 2019-10-09 ENCOUNTER — ASOB RESULT (OUTPATIENT)
Age: 34
End: 2019-10-09

## 2019-10-09 PROCEDURE — 76811 OB US DETAILED SNGL FETUS: CPT

## 2019-10-24 ENCOUNTER — NON-APPOINTMENT (OUTPATIENT)
Age: 34
End: 2019-10-24

## 2019-10-24 ENCOUNTER — APPOINTMENT (OUTPATIENT)
Dept: OBGYN | Facility: CLINIC | Age: 34
End: 2019-10-24
Payer: COMMERCIAL

## 2019-10-24 VITALS
SYSTOLIC BLOOD PRESSURE: 112 MMHG | HEIGHT: 64 IN | WEIGHT: 132 LBS | DIASTOLIC BLOOD PRESSURE: 74 MMHG | BODY MASS INDEX: 22.53 KG/M2

## 2019-10-24 PROCEDURE — 0502F SUBSEQUENT PRENATAL CARE: CPT

## 2019-11-25 ENCOUNTER — APPOINTMENT (OUTPATIENT)
Dept: OBGYN | Facility: CLINIC | Age: 34
End: 2019-11-25
Payer: COMMERCIAL

## 2019-11-25 ENCOUNTER — NON-APPOINTMENT (OUTPATIENT)
Age: 34
End: 2019-11-25

## 2019-11-25 VITALS
BODY MASS INDEX: 23.88 KG/M2 | DIASTOLIC BLOOD PRESSURE: 63 MMHG | WEIGHT: 139.9 LBS | SYSTOLIC BLOOD PRESSURE: 103 MMHG | HEIGHT: 64 IN

## 2019-11-25 PROCEDURE — 0502F SUBSEQUENT PRENATAL CARE: CPT

## 2019-11-28 LAB
BASOPHILS # BLD AUTO: 0.03 K/UL
BASOPHILS NFR BLD AUTO: 0.4 %
EOSINOPHIL # BLD AUTO: 0.18 K/UL
EOSINOPHIL NFR BLD AUTO: 2.3 %
HCT VFR BLD CALC: 32.2 %
HGB BLD-MCNC: 10 G/DL
IMM GRANULOCYTES NFR BLD AUTO: 1 %
LYMPHOCYTES # BLD AUTO: 1.51 K/UL
LYMPHOCYTES NFR BLD AUTO: 19.5 %
MAN DIFF?: NORMAL
MCHC RBC-ENTMCNC: 30.2 PG
MCHC RBC-ENTMCNC: 31.1 GM/DL
MCV RBC AUTO: 97.3 FL
MONOCYTES # BLD AUTO: 0.52 K/UL
MONOCYTES NFR BLD AUTO: 6.7 %
NEUTROPHILS # BLD AUTO: 5.43 K/UL
NEUTROPHILS NFR BLD AUTO: 70.1 %
PLATELET # BLD AUTO: 278 K/UL
RBC # BLD: 3.31 M/UL
RBC # FLD: 13.3 %
T PALLIDUM AB SER QL IA: NEGATIVE
WBC # FLD AUTO: 7.75 K/UL

## 2019-11-29 LAB — GLUCOSE 1H P 50 G GLC PO SERPL-MCNC: 140 MG/DL

## 2019-12-11 ENCOUNTER — OTHER (OUTPATIENT)
Age: 34
End: 2019-12-11

## 2019-12-18 ENCOUNTER — NON-APPOINTMENT (OUTPATIENT)
Age: 34
End: 2019-12-18

## 2019-12-18 ENCOUNTER — APPOINTMENT (OUTPATIENT)
Dept: OBGYN | Facility: CLINIC | Age: 34
End: 2019-12-18
Payer: COMMERCIAL

## 2019-12-18 VITALS
DIASTOLIC BLOOD PRESSURE: 78 MMHG | SYSTOLIC BLOOD PRESSURE: 127 MMHG | BODY MASS INDEX: 24.59 KG/M2 | HEIGHT: 64 IN | WEIGHT: 144 LBS

## 2019-12-18 LAB
GLUCOSE 1H P 100 G GLC PO SERPL-MCNC: 130 MG/DL
GLUCOSE 2H P CHAL SERPL-MCNC: 103 MG/DL
GLUCOSE 3H P CHAL SERPL-MCNC: 137 MG/DL
GLUCOSE BS SERPL-MCNC: 76 MG/DL

## 2019-12-18 PROCEDURE — G0008: CPT

## 2019-12-18 PROCEDURE — 90686 IIV4 VACC NO PRSV 0.5 ML IM: CPT

## 2019-12-18 PROCEDURE — 0502F SUBSEQUENT PRENATAL CARE: CPT

## 2019-12-30 ENCOUNTER — NON-APPOINTMENT (OUTPATIENT)
Age: 34
End: 2019-12-30

## 2019-12-30 ENCOUNTER — APPOINTMENT (OUTPATIENT)
Dept: OBGYN | Facility: CLINIC | Age: 34
End: 2019-12-30
Payer: COMMERCIAL

## 2019-12-30 VITALS
SYSTOLIC BLOOD PRESSURE: 110 MMHG | DIASTOLIC BLOOD PRESSURE: 75 MMHG | BODY MASS INDEX: 25.61 KG/M2 | WEIGHT: 150 LBS | HEIGHT: 64 IN

## 2019-12-30 PROCEDURE — 99213 OFFICE O/P EST LOW 20 MIN: CPT | Mod: 25

## 2019-12-30 PROCEDURE — 0502F SUBSEQUENT PRENATAL CARE: CPT

## 2020-01-03 ENCOUNTER — APPOINTMENT (OUTPATIENT)
Dept: ANTEPARTUM | Facility: CLINIC | Age: 35
End: 2020-01-03
Payer: COMMERCIAL

## 2020-01-03 ENCOUNTER — ASOB RESULT (OUTPATIENT)
Age: 35
End: 2020-01-03

## 2020-01-03 PROCEDURE — 76819 FETAL BIOPHYS PROFIL W/O NST: CPT

## 2020-01-03 PROCEDURE — 76816 OB US FOLLOW-UP PER FETUS: CPT

## 2020-01-08 ENCOUNTER — FORM ENCOUNTER (OUTPATIENT)
Age: 35
End: 2020-01-08

## 2020-01-09 ENCOUNTER — APPOINTMENT (OUTPATIENT)
Dept: ULTRASOUND IMAGING | Facility: IMAGING CENTER | Age: 35
End: 2020-01-09
Payer: COMMERCIAL

## 2020-01-09 ENCOUNTER — OUTPATIENT (OUTPATIENT)
Dept: OUTPATIENT SERVICES | Facility: HOSPITAL | Age: 35
LOS: 1 days | End: 2020-01-09
Payer: COMMERCIAL

## 2020-01-09 DIAGNOSIS — Z87.59 PERSONAL HISTORY OF OTHER COMPLICATIONS OF PREGNANCY, CHILDBIRTH AND THE PUERPERIUM: Chronic | ICD-10-CM

## 2020-01-09 DIAGNOSIS — R22.31 LOCALIZED SWELLING, MASS AND LUMP, RIGHT UPPER LIMB: ICD-10-CM

## 2020-01-09 PROCEDURE — 76882 US LMTD JT/FCL EVL NVASC XTR: CPT

## 2020-01-09 PROCEDURE — 76882 US LMTD JT/FCL EVL NVASC XTR: CPT | Mod: 26,RT

## 2020-01-10 ENCOUNTER — OUTPATIENT (OUTPATIENT)
Dept: INPATIENT UNIT | Facility: HOSPITAL | Age: 35
LOS: 1 days | Discharge: HOME | End: 2020-01-10
Payer: COMMERCIAL

## 2020-01-10 ENCOUNTER — EMERGENCY (EMERGENCY)
Facility: HOSPITAL | Age: 35
LOS: 1 days | Discharge: STILL ADMITTED OR ADMINISTRATIVE | End: 2020-01-10
Admitting: EMERGENCY MEDICINE

## 2020-01-10 VITALS
DIASTOLIC BLOOD PRESSURE: 69 MMHG | OXYGEN SATURATION: 100 % | TEMPERATURE: 99 F | RESPIRATION RATE: 20 BRPM | HEART RATE: 104 BPM | SYSTOLIC BLOOD PRESSURE: 104 MMHG

## 2020-01-10 VITALS — SYSTOLIC BLOOD PRESSURE: 87 MMHG | HEART RATE: 88 BPM | DIASTOLIC BLOOD PRESSURE: 50 MMHG

## 2020-01-10 VITALS
TEMPERATURE: 98 F | RESPIRATION RATE: 16 BRPM | DIASTOLIC BLOOD PRESSURE: 50 MMHG | SYSTOLIC BLOOD PRESSURE: 87 MMHG | HEART RATE: 88 BPM

## 2020-01-10 DIAGNOSIS — Z87.59 PERSONAL HISTORY OF OTHER COMPLICATIONS OF PREGNANCY, CHILDBIRTH AND THE PUERPERIUM: Chronic | ICD-10-CM

## 2020-01-10 DIAGNOSIS — Z98.891 HISTORY OF UTERINE SCAR FROM PREVIOUS SURGERY: Chronic | ICD-10-CM

## 2020-01-10 DIAGNOSIS — Z3A.00 WEEKS OF GESTATION OF PREGNANCY NOT SPECIFIED: ICD-10-CM

## 2020-01-10 DIAGNOSIS — O26.899 OTHER SPECIFIED PREGNANCY RELATED CONDITIONS, UNSPECIFIED TRIMESTER: ICD-10-CM

## 2020-01-10 PROCEDURE — 59025 FETAL NON-STRESS TEST: CPT | Mod: 26

## 2020-01-10 PROCEDURE — 99202 OFFICE O/P NEW SF 15 MIN: CPT

## 2020-01-10 RX ORDER — SIMETHICONE 80 MG/1
80 TABLET, CHEWABLE ORAL ONCE
Refills: 0 | Status: COMPLETED | OUTPATIENT
Start: 2020-01-10 | End: 2020-01-10

## 2020-01-10 RX ADMIN — SIMETHICONE 80 MILLIGRAM(S): 80 TABLET, CHEWABLE ORAL at 22:23

## 2020-01-10 NOTE — ED ADULT TRIAGE NOTE - CHIEF COMPLAINT QUOTE
Pt c/o approx 8 months pregnant with abdominal pain that started approx 5PM.  KAYLAN 2/24.  Denies any vagina bleeding. Pt appears uncomfortable.  Spoke with Reinier, pt ok for L&D.

## 2020-01-10 NOTE — OB PROVIDER TRIAGE NOTE - NS_OBGYNHISTORY_OBGYN_ALL_OB_FT
4/15/2012 8-5  Primary  [facility]  [facility]

## 2020-01-10 NOTE — OB PROVIDER TRIAGE NOTE - NSHPPHYSICALEXAM_GEN_ALL_CORE
TAS: Cephalic presentation, placenta, MILADIS: 20, EFW:2365gm, BPP:8/8  FHR: 135bpm, moderate variability, accels, no decels  Florencia irregularly

## 2020-01-10 NOTE — OB PROVIDER TRIAGE NOTE - NSOBPROVIDERNOTE_OBGYN_ALL_OB_FT
Discussed findings with Dr. De La Vega. Pt. d/c'd home. Pt. to follow up with next OB appointment. Pt. informed to follow BRAT diet and simethicone for gas prn. Increase PO hydration encouraged. Pt. instructed to return to triage with increase abdominal contractions, LOF, VB, or decrease FM. Dr. Millan's pt. is a 33y/o  EGA 33.4wks reports of eating [employer] at 3pm, stomach pain at 4pm and vomited x3. Pt. states after she vomited she feels better. No longer has nausea or stomach pain. Pt. only reports of gas. Pt. denies abdominal contractions, LOF, and Vb. Pt. reports FM.     AP: Denies  Medical Hx: Medullary Sponge Kidney   Surgical Hx: Denies  OBGYN Hx:    4/15/2012 8-5  Primary  7/15/2014 9-5 for breech  TOPx1 2013  NKDA    Assessment/Plan:  TAS: Cephalic presentation, placenta, MILADIS: 20, EFW:2365gm, BPP:  FHR: 135bpm, moderate variability, accels, no decels  Florencia irregularly  Simethicone 80mg ordered    Discussed findings with Dr. De La Vega. Pt. d/c'd home. Pt. to follow up with next OB appointment. Pt. informed to follow BRAT diet and simethicone for gas prn. Increase PO hydration encouraged. Pt. instructed to return to triage with increase abdominal contractions, LOF, VB, or decrease FM.

## 2020-01-10 NOTE — OB PROVIDER TRIAGE NOTE - ADDITIONAL INSTRUCTIONS
Discussed findings with Dr. De La Vega. Pt. d/c'd home. Pt. to follow up with next OB appointment. Pt. informed to follow BRAT diet and simethicone for gas prn. Increase PO hydration encouraged. Pt. instructed to return to triage with increase abdominal contractions, LOF, VB, or decrease FM.

## 2020-01-10 NOTE — OB PROVIDER TRIAGE NOTE - HISTORY OF PRESENT ILLNESS
Dr. Millan's pt. is a 35y/o  EGA 33.4wks reports of eating [employer] at 3pm, stomach pain at 4pm and vomited x3. Pt. states after she vomited she feels better. No longer has nausea or stomach pain. Pt. only reports of gas. Pt. denies abdominal contractions, LOF, and Vb. Pt. reports FM.

## 2020-01-13 ENCOUNTER — APPOINTMENT (OUTPATIENT)
Dept: OBGYN | Facility: CLINIC | Age: 35
End: 2020-01-13
Payer: COMMERCIAL

## 2020-01-13 ENCOUNTER — NON-APPOINTMENT (OUTPATIENT)
Age: 35
End: 2020-01-13

## 2020-01-13 VITALS
HEIGHT: 64 IN | WEIGHT: 150 LBS | DIASTOLIC BLOOD PRESSURE: 48 MMHG | SYSTOLIC BLOOD PRESSURE: 89 MMHG | BODY MASS INDEX: 25.61 KG/M2

## 2020-01-13 PROCEDURE — 0502F SUBSEQUENT PRENATAL CARE: CPT

## 2020-01-14 ENCOUNTER — OTHER (OUTPATIENT)
Age: 35
End: 2020-01-14

## 2020-02-03 ENCOUNTER — APPOINTMENT (OUTPATIENT)
Dept: OBGYN | Facility: CLINIC | Age: 35
End: 2020-02-03
Payer: COMMERCIAL

## 2020-02-03 ENCOUNTER — LABORATORY RESULT (OUTPATIENT)
Age: 35
End: 2020-02-03

## 2020-02-03 ENCOUNTER — NON-APPOINTMENT (OUTPATIENT)
Age: 35
End: 2020-02-03

## 2020-02-03 VITALS
HEIGHT: 64 IN | SYSTOLIC BLOOD PRESSURE: 115 MMHG | WEIGHT: 157 LBS | BODY MASS INDEX: 26.8 KG/M2 | DIASTOLIC BLOOD PRESSURE: 75 MMHG

## 2020-02-03 PROCEDURE — 0502F SUBSEQUENT PRENATAL CARE: CPT

## 2020-02-06 ENCOUNTER — OUTPATIENT (OUTPATIENT)
Dept: INPATIENT UNIT | Facility: HOSPITAL | Age: 35
LOS: 1 days | Discharge: HOME | End: 2020-02-06
Payer: COMMERCIAL

## 2020-02-06 VITALS — DIASTOLIC BLOOD PRESSURE: 59 MMHG | HEART RATE: 99 BPM | SYSTOLIC BLOOD PRESSURE: 96 MMHG

## 2020-02-06 VITALS
HEART RATE: 106 BPM | RESPIRATION RATE: 16 BRPM | DIASTOLIC BLOOD PRESSURE: 58 MMHG | SYSTOLIC BLOOD PRESSURE: 103 MMHG | TEMPERATURE: 98 F

## 2020-02-06 DIAGNOSIS — Z98.891 HISTORY OF UTERINE SCAR FROM PREVIOUS SURGERY: Chronic | ICD-10-CM

## 2020-02-06 DIAGNOSIS — Z3A.00 WEEKS OF GESTATION OF PREGNANCY NOT SPECIFIED: ICD-10-CM

## 2020-02-06 DIAGNOSIS — Z87.59 PERSONAL HISTORY OF OTHER COMPLICATIONS OF PREGNANCY, CHILDBIRTH AND THE PUERPERIUM: Chronic | ICD-10-CM

## 2020-02-06 DIAGNOSIS — O26.899 OTHER SPECIFIED PREGNANCY RELATED CONDITIONS, UNSPECIFIED TRIMESTER: ICD-10-CM

## 2020-02-06 LAB
GP B STREP DNA SPEC QL NAA+PROBE: DETECTED
GP B STREP DNA SPEC QL NAA+PROBE: NORMAL
SOURCE GBS: NORMAL

## 2020-02-06 PROCEDURE — 99214 OFFICE O/P EST MOD 30 MIN: CPT

## 2020-02-06 PROCEDURE — 59025 FETAL NON-STRESS TEST: CPT | Mod: 26

## 2020-02-06 NOTE — OB PROVIDER TRIAGE NOTE - ADDITIONAL INSTRUCTIONS
Patient is cleared for discharge home with labor precautions.  ROM also discussed.  To f/u with Dr De La Vega as scheduled if none of the above.

## 2020-02-06 NOTE — OB PROVIDER TRIAGE NOTE - HISTORY OF PRESENT ILLNESS
Patient is a 33y/o  @ 37 3/7wks gestation who reports to triage with c/o vaginal bleeding and dizziness.  Patient states Patient is a 35y/o  @ 37 3/7wks gestation who reports to triage with c/o vaginal bleeding and dizziness.  Patient states this morning at approximately she had some red vaginal bleeding after using the bathroom.  The dizziness occurred after she saw the bleeding.  Denies contractions, or Loss of Fluid.  Denies h/o placenta previa.  Reports good fetal movements.    AP Course - elevated GCT;  GTT- wnl  Meds - pnv  NKDA

## 2020-02-06 NOTE — OB PROVIDER TRIAGE NOTE - NSOBPROVIDERNOTE_OBGYN_ALL_OB_FT
Patient states this morning at approximately she had some red vaginal bleeding after using the bathroom.  The dizziness occurred after she saw the bleeding.  Denies contractions, or Loss of Fluid.  Denies h/o placenta previa.  Reports good fetal movements.    h/o prior c/s.  Approved for TOLAC.    AP Course - elevated GCT;  GTT- wnl  Meds - pnv  NKDA     PMH/GYN/SH - denies  OB   - - 4/15/2012 - 8lbs 5oz  -C/S  for breech presentation - 6/15/2014 - 9lbs 5oz;  IOL 2' LGA  GYN  -Left breast cyst  -ovarian cyst  Psych - h/o panic attacks;  was seen the ED 2018           Denies meds;  does yoga and meditation    Vital Signs Last 24 Hrs  T(C): 36.8 (2020 09:32), Max: 36.8 (2020 09:25)  T(F): 98.24 (2020 09:32), Max: 98.24 (2020 09:32)  HR: 101 (2020 10:18) (97 - 136)  BP: 103/58 (2020 09:26) (103/58 - 103/58)  RR: 16 (2020 09:25) (16 - 16)  SpO2: 95% (2020 10:18) (90% - 100%)    Lungs - ctab  Heart - tachycardia.  Patient is known to have panic attacks           after talking to patient and reassuring her with the assistance of other D&T personnel (nurse & other provider), HR resolved into the 90's to 100bpm  Abdomen gravid, soft and nontender  NST -  TAS - vtx/post plac/aafv  SSE - 5cc dark red clot with mucus cleaned from the vault.  No active bleeding noted.  SVE - 2/50/-3 Intact  Impression: very early labor    Discssed patient with    Plan: Patient states this morning at approximately she had some red vaginal bleeding after using the bathroom.  The dizziness occurred after she saw the bleeding.  Denies contractions, or Loss of Fluid.  Denies h/o placenta previa.  Reports good fetal movements.    h/o prior c/s.  Approved for TOLAC.    AP Course - elevated GCT;  GTT- wnl  Meds - pnv  NKDA     PMH/GYN/SH - denies  OB   - - 4/15/2012 - 8lbs 5oz  -C/S  for breech presentation - 6/15/2014 - 9lbs 5oz;  IOL 2' LGA  GYN  -Left breast cyst  -ovarian cyst  Psych - h/o panic attacks;  was seen the ED 2018           Denies meds;  does yoga and meditation    Vital Signs Last 24 Hrs  T(C): 36.8 (2020 09:32), Max: 36.8 (2020 09:25)  T(F): 98.24 (2020 09:32), Max: 98.24 (2020 09:32)  HR: 101 (2020 10:18) (97 - 136)  BP: 103/58 (2020 09:26) (103/58 - 103/58)  RR: 16 (2020 09:25) (16 - 16)  SpO2: 95% (2020 10:18) (90% - 100%)    Lungs - ctab  Heart - tachycardia.  Patient is known to have panic attacks           after talking to patient and reassuring her with the assistance of other D&T personnel (nurse & other provider), HR resolved into the 90's to 100bpm  Abdomen gravid, soft and nontender  NST -  TAS - vtx/post plac/aafv  SSE - 5cc dark red clot with mucus cleaned from the vault.  No active bleeding noted.  SVE - /-3 Intact  Impression: very early labor  Next pn appt on Monday 2/10/20    Discussed patient with Dr Meza.  Plan:  patient is cleared for discharge home with labor precautions.  ROM also discussed.  To f/u with Dr De La Vega as scheduled if none of the above.

## 2020-02-06 NOTE — OB PROVIDER TRIAGE NOTE - NSHPPHYSICALEXAM_GEN_ALL_CORE
Vital Signs Last 24 Hrs  T(C): 36.8 (06 Feb 2020 09:32), Max: 36.8 (06 Feb 2020 09:25)  T(F): 98.24 (06 Feb 2020 09:32), Max: 98.24 (06 Feb 2020 09:32)  HR: 101 (06 Feb 2020 10:18) (97 - 136)  BP: 103/58 (06 Feb 2020 09:26) (103/58 - 103/58)  RR: 16 (06 Feb 2020 09:25) (16 - 16)  SpO2: 95% (06 Feb 2020 10:18) (90% - 100%)    Lungs - ctab  Heart - tachycardia.  Patient is known to have panic attacks           after talking to patient and reassuring her with the assistance of other D&T personnel (nurse & other provider), HR resolved into the 90's to 100bpm  Abdomen gravid, soft and nontender  NST -  TAS - vtx/post plac/aafv  SSE - 5cc dark red clot with mucus cleaned from the vault.  No active bleeding noted.  SVE - 2/50/-3 Intact

## 2020-02-06 NOTE — OB PROVIDER TRIAGE NOTE - NS_OBGYNHISTORY_OBGYN_ALL_OB_FT
OB   - - 4/15/2012 - 8lbs 5oz  -C/S  for breech presentation - 6/15/2014 - 9lbs 5oz;  IOL 2' LGA  GYN  -Left breast cyst  -ovarian cyst

## 2020-02-10 ENCOUNTER — NON-APPOINTMENT (OUTPATIENT)
Age: 35
End: 2020-02-10

## 2020-02-10 ENCOUNTER — APPOINTMENT (OUTPATIENT)
Dept: OBGYN | Facility: CLINIC | Age: 35
End: 2020-02-10
Payer: COMMERCIAL

## 2020-02-10 VITALS
DIASTOLIC BLOOD PRESSURE: 68 MMHG | SYSTOLIC BLOOD PRESSURE: 110 MMHG | HEIGHT: 64 IN | BODY MASS INDEX: 26.46 KG/M2 | WEIGHT: 155 LBS

## 2020-02-10 PROCEDURE — 0502F SUBSEQUENT PRENATAL CARE: CPT

## 2020-02-19 ENCOUNTER — APPOINTMENT (OUTPATIENT)
Dept: OBGYN | Facility: CLINIC | Age: 35
End: 2020-02-19

## 2020-02-19 ENCOUNTER — NON-APPOINTMENT (OUTPATIENT)
Age: 35
End: 2020-02-19

## 2020-02-19 VITALS
SYSTOLIC BLOOD PRESSURE: 104 MMHG | WEIGHT: 157 LBS | DIASTOLIC BLOOD PRESSURE: 72 MMHG | BODY MASS INDEX: 26.8 KG/M2 | HEIGHT: 64 IN

## 2020-02-24 ENCOUNTER — APPOINTMENT (OUTPATIENT)
Dept: OBGYN | Facility: CLINIC | Age: 35
End: 2020-02-24

## 2020-02-24 ENCOUNTER — TRANSCRIPTION ENCOUNTER (OUTPATIENT)
Age: 35
End: 2020-02-24

## 2020-02-24 ENCOUNTER — INPATIENT (INPATIENT)
Facility: HOSPITAL | Age: 35
LOS: 1 days | Discharge: HOME | End: 2020-02-26
Attending: OBSTETRICS & GYNECOLOGY | Admitting: OBSTETRICS & GYNECOLOGY
Payer: COMMERCIAL

## 2020-02-24 VITALS
SYSTOLIC BLOOD PRESSURE: 113 MMHG | DIASTOLIC BLOOD PRESSURE: 66 MMHG | RESPIRATION RATE: 19 BRPM | HEART RATE: 96 BPM | TEMPERATURE: 98 F

## 2020-02-24 DIAGNOSIS — O26.899 OTHER SPECIFIED PREGNANCY RELATED CONDITIONS, UNSPECIFIED TRIMESTER: ICD-10-CM

## 2020-02-24 DIAGNOSIS — Z3A.00 WEEKS OF GESTATION OF PREGNANCY NOT SPECIFIED: ICD-10-CM

## 2020-02-24 DIAGNOSIS — Z98.891 HISTORY OF UTERINE SCAR FROM PREVIOUS SURGERY: Chronic | ICD-10-CM

## 2020-02-24 LAB
BASOPHILS # BLD AUTO: 0.03 K/UL — SIGNIFICANT CHANGE UP (ref 0–0.2)
BASOPHILS NFR BLD AUTO: 0.4 % — SIGNIFICANT CHANGE UP (ref 0–2)
BLD GP AB SCN SERPL QL: NEGATIVE — SIGNIFICANT CHANGE UP
EOSINOPHIL # BLD AUTO: 0.15 K/UL — SIGNIFICANT CHANGE UP (ref 0–0.5)
EOSINOPHIL NFR BLD AUTO: 2.2 % — SIGNIFICANT CHANGE UP (ref 0–6)
HCT VFR BLD CALC: 34.4 % — LOW (ref 34.5–45)
HGB BLD-MCNC: 11.1 G/DL — LOW (ref 11.5–15.5)
IMM GRANULOCYTES NFR BLD AUTO: 0.9 % — SIGNIFICANT CHANGE UP (ref 0–1.5)
LYMPHOCYTES # BLD AUTO: 1.76 K/UL — SIGNIFICANT CHANGE UP (ref 1–3.3)
LYMPHOCYTES # BLD AUTO: 26.3 % — SIGNIFICANT CHANGE UP (ref 13–44)
MCHC RBC-ENTMCNC: 29.9 PG — SIGNIFICANT CHANGE UP (ref 27–34)
MCHC RBC-ENTMCNC: 32.3 % — SIGNIFICANT CHANGE UP (ref 32–36)
MCV RBC AUTO: 92.7 FL — SIGNIFICANT CHANGE UP (ref 80–100)
MONOCYTES # BLD AUTO: 0.64 K/UL — SIGNIFICANT CHANGE UP (ref 0–0.9)
MONOCYTES NFR BLD AUTO: 9.6 % — SIGNIFICANT CHANGE UP (ref 2–14)
NEUTROPHILS # BLD AUTO: 4.05 K/UL — SIGNIFICANT CHANGE UP (ref 1.8–7.4)
NEUTROPHILS NFR BLD AUTO: 60.6 % — SIGNIFICANT CHANGE UP (ref 43–77)
NRBC # FLD: 0 K/UL — SIGNIFICANT CHANGE UP (ref 0–0)
PLATELET # BLD AUTO: 218 K/UL — SIGNIFICANT CHANGE UP (ref 150–400)
PMV BLD: 9.2 FL — SIGNIFICANT CHANGE UP (ref 7–13)
RBC # BLD: 3.71 M/UL — LOW (ref 3.8–5.2)
RBC # FLD: 13.7 % — SIGNIFICANT CHANGE UP (ref 10.3–14.5)
RH IG SCN BLD-IMP: POSITIVE — SIGNIFICANT CHANGE UP
T PALLIDUM AB TITR SER: NEGATIVE — SIGNIFICANT CHANGE UP
WBC # BLD: 6.69 K/UL — SIGNIFICANT CHANGE UP (ref 3.8–10.5)
WBC # FLD AUTO: 6.69 K/UL — SIGNIFICANT CHANGE UP (ref 3.8–10.5)

## 2020-02-24 PROCEDURE — 59400 OBSTETRICAL CARE: CPT | Mod: U9,UB,GC

## 2020-02-24 RX ORDER — ACETAMINOPHEN 500 MG
3 TABLET ORAL
Qty: 0 | Refills: 0 | DISCHARGE
Start: 2020-02-24

## 2020-02-24 RX ORDER — SODIUM CHLORIDE 9 MG/ML
3 INJECTION INTRAMUSCULAR; INTRAVENOUS; SUBCUTANEOUS EVERY 8 HOURS
Refills: 0 | Status: DISCONTINUED | OUTPATIENT
Start: 2020-02-24 | End: 2020-02-26

## 2020-02-24 RX ORDER — IBUPROFEN 200 MG
600 TABLET ORAL EVERY 6 HOURS
Refills: 0 | Status: COMPLETED | OUTPATIENT
Start: 2020-02-24 | End: 2021-01-22

## 2020-02-24 RX ORDER — AMPICILLIN TRIHYDRATE 250 MG
2 CAPSULE ORAL ONCE
Refills: 0 | Status: COMPLETED | OUTPATIENT
Start: 2020-02-24 | End: 2020-02-24

## 2020-02-24 RX ORDER — SODIUM CHLORIDE 9 MG/ML
1000 INJECTION, SOLUTION INTRAVENOUS
Refills: 0 | Status: DISCONTINUED | OUTPATIENT
Start: 2020-02-24 | End: 2020-02-24

## 2020-02-24 RX ORDER — AER TRAVELER 0.5 G/1
1 SOLUTION RECTAL; TOPICAL EVERY 4 HOURS
Refills: 0 | Status: DISCONTINUED | OUTPATIENT
Start: 2020-02-24 | End: 2020-02-26

## 2020-02-24 RX ORDER — ACETAMINOPHEN 500 MG
975 TABLET ORAL
Refills: 0 | Status: DISCONTINUED | OUTPATIENT
Start: 2020-02-24 | End: 2020-02-26

## 2020-02-24 RX ORDER — PRAMOXINE HYDROCHLORIDE 150 MG/15G
1 AEROSOL, FOAM RECTAL EVERY 4 HOURS
Refills: 0 | Status: DISCONTINUED | OUTPATIENT
Start: 2020-02-24 | End: 2020-02-26

## 2020-02-24 RX ORDER — IBUPROFEN 200 MG
600 TABLET ORAL EVERY 6 HOURS
Refills: 0 | Status: DISCONTINUED | OUTPATIENT
Start: 2020-02-24 | End: 2020-02-26

## 2020-02-24 RX ORDER — DIBUCAINE 1 %
1 OINTMENT (GRAM) RECTAL EVERY 6 HOURS
Refills: 0 | Status: DISCONTINUED | OUTPATIENT
Start: 2020-02-24 | End: 2020-02-26

## 2020-02-24 RX ORDER — OXYTOCIN 10 UNIT/ML
333.33 VIAL (ML) INJECTION
Qty: 20 | Refills: 0 | Status: DISCONTINUED | OUTPATIENT
Start: 2020-02-24 | End: 2020-02-25

## 2020-02-24 RX ORDER — LANOLIN
1 OINTMENT (GRAM) TOPICAL EVERY 6 HOURS
Refills: 0 | Status: DISCONTINUED | OUTPATIENT
Start: 2020-02-24 | End: 2020-02-26

## 2020-02-24 RX ORDER — BENZOCAINE 10 %
1 GEL (GRAM) MUCOUS MEMBRANE EVERY 6 HOURS
Refills: 0 | Status: DISCONTINUED | OUTPATIENT
Start: 2020-02-24 | End: 2020-02-26

## 2020-02-24 RX ORDER — MAGNESIUM HYDROXIDE 400 MG/1
30 TABLET, CHEWABLE ORAL
Refills: 0 | Status: DISCONTINUED | OUTPATIENT
Start: 2020-02-24 | End: 2020-02-26

## 2020-02-24 RX ORDER — OXYCODONE HYDROCHLORIDE 5 MG/1
5 TABLET ORAL
Refills: 0 | Status: DISCONTINUED | OUTPATIENT
Start: 2020-02-24 | End: 2020-02-26

## 2020-02-24 RX ORDER — SIMETHICONE 80 MG/1
80 TABLET, CHEWABLE ORAL EVERY 4 HOURS
Refills: 0 | Status: DISCONTINUED | OUTPATIENT
Start: 2020-02-24 | End: 2020-02-26

## 2020-02-24 RX ORDER — OXYTOCIN 10 UNIT/ML
VIAL (ML) INJECTION
Qty: 20 | Refills: 0 | Status: DISCONTINUED | OUTPATIENT
Start: 2020-02-24 | End: 2020-02-25

## 2020-02-24 RX ORDER — DIPHENHYDRAMINE HCL 50 MG
25 CAPSULE ORAL EVERY 6 HOURS
Refills: 0 | Status: DISCONTINUED | OUTPATIENT
Start: 2020-02-24 | End: 2020-02-26

## 2020-02-24 RX ORDER — IBUPROFEN 200 MG
1 TABLET ORAL
Qty: 0 | Refills: 0 | DISCHARGE
Start: 2020-02-24

## 2020-02-24 RX ORDER — TETANUS TOXOID, REDUCED DIPHTHERIA TOXOID AND ACELLULAR PERTUSSIS VACCINE, ADSORBED 5; 2.5; 8; 8; 2.5 [IU]/.5ML; [IU]/.5ML; UG/.5ML; UG/.5ML; UG/.5ML
0.5 SUSPENSION INTRAMUSCULAR ONCE
Refills: 0 | Status: DISCONTINUED | OUTPATIENT
Start: 2020-02-24 | End: 2020-02-26

## 2020-02-24 RX ORDER — AMPICILLIN TRIHYDRATE 250 MG
1 CAPSULE ORAL EVERY 4 HOURS
Refills: 0 | Status: DISCONTINUED | OUTPATIENT
Start: 2020-02-24 | End: 2020-02-24

## 2020-02-24 RX ORDER — KETOROLAC TROMETHAMINE 30 MG/ML
30 SYRINGE (ML) INJECTION ONCE
Refills: 0 | Status: DISCONTINUED | OUTPATIENT
Start: 2020-02-24 | End: 2020-02-24

## 2020-02-24 RX ORDER — OXYCODONE HYDROCHLORIDE 5 MG/1
5 TABLET ORAL ONCE
Refills: 0 | Status: DISCONTINUED | OUTPATIENT
Start: 2020-02-24 | End: 2020-02-26

## 2020-02-24 RX ORDER — HYDROCORTISONE 1 %
1 OINTMENT (GRAM) TOPICAL EVERY 6 HOURS
Refills: 0 | Status: DISCONTINUED | OUTPATIENT
Start: 2020-02-24 | End: 2020-02-26

## 2020-02-24 RX ORDER — GLYCERIN ADULT
1 SUPPOSITORY, RECTAL RECTAL AT BEDTIME
Refills: 0 | Status: DISCONTINUED | OUTPATIENT
Start: 2020-02-24 | End: 2020-02-26

## 2020-02-24 RX ORDER — AMPICILLIN TRIHYDRATE 250 MG
CAPSULE ORAL
Refills: 0 | Status: DISCONTINUED | OUTPATIENT
Start: 2020-02-24 | End: 2020-02-24

## 2020-02-24 RX ADMIN — Medication 216 GRAM(S): at 04:07

## 2020-02-24 RX ADMIN — Medication 108 GRAM(S): at 08:19

## 2020-02-24 RX ADMIN — Medication 600 MILLIGRAM(S): at 19:53

## 2020-02-24 RX ADMIN — Medication 600 MILLIGRAM(S): at 20:55

## 2020-02-24 RX ADMIN — Medication 975 MILLIGRAM(S): at 11:27

## 2020-02-24 RX ADMIN — SODIUM CHLORIDE 3 MILLILITER(S): 9 INJECTION INTRAMUSCULAR; INTRAVENOUS; SUBCUTANEOUS at 15:26

## 2020-02-24 RX ADMIN — SODIUM CHLORIDE 250 MILLILITER(S): 9 INJECTION, SOLUTION INTRAVENOUS at 09:13

## 2020-02-24 RX ADMIN — Medication 1000 MILLIUNIT(S)/MIN: at 11:04

## 2020-02-24 RX ADMIN — SODIUM CHLORIDE 3 MILLILITER(S): 9 INJECTION INTRAMUSCULAR; INTRAVENOUS; SUBCUTANEOUS at 22:00

## 2020-02-24 RX ADMIN — Medication 975 MILLIGRAM(S): at 12:27

## 2020-02-24 NOTE — OB PROVIDER TRIAGE NOTE - NSOBPROVIDERNOTE_OBGYN_ALL_OB_FT
34 yr old  @ 40, present with LOF @ 3 am, with ctx. Adrienne VB/LOF. GBS positive, TOLAC, EFW 3600  PNI: TOLAc  Obhx: , FT,  8.5 lbs           , FT  Breech 9.5 lb  Gynhx: denies  Surghx:    Medhx: denies    VS: 113/66  TOCO: ctx irregular  NST: 130, moderate variability, in progress  SSE: large pooling, BB  VE: /-3, BB  Sono: vertex    Admit  Expectant management  Epidural  VE @ 7 am  TOLAC  Discussed with Dr. Weiner

## 2020-02-24 NOTE — OB RN PATIENT PROFILE - HEIGHT IN FEET
"  History     Chief Complaint   Patient presents with     Testicular/scrotal Pain     notes pain under his scrotum for the past several days. notes there is an area that \"really hurts when I touch it\"     HPI  Aaron Conner is a 24 year old male who for the fourth time in recent days for evaluation of concerns just beneath his scrotum.  It sounds as though complaints have evolved a little bit in talking to him as he reports initially he had discomfort in different locations.  He now notes discomfort just beneath his scrotum.  No dysuria.  No frequency no urgency.  No discharge.  No testicular discomfort.  No scrotal discomfort otherwise.  No abdominal pain.  No nausea.  No vomiting.  No concern for STD.  He states his pain is being between his scrotum and his anus.  No fevers.  No chills.  No history of infection.    Allergies:  No Known Allergies    Past Medical History:    Past Medical History:   Diagnosis Date     Allergic rhinitis, cause unspecified 12/10/2010     Anxiety 3/28/2011     Cephalgia 5/14/2012     Chronic tonsillitis 5/14/2012     Peripheral vertigo, unspecified 3/14/2011       Past Surgical History:    Past Surgical History:   Procedure Laterality Date     APPENDECTOMY      appendicitis     foreign  body removed from right long finger  1994     maxillary antrostomy  5/2011    LT       Family History:    Family History   Problem Relation Age of Onset     Cancer Maternal Grandfather         lung     Cancer Mother         ovarian       Social History:  Marital Status:  Single [1]  Social History     Tobacco Use     Smoking status: Current Every Day Smoker     Packs/day: 1.00     Types: Cigarettes     Smokeless tobacco: Former User     Types: Chew   Substance Use Topics     Alcohol use: Yes     Comment: social     Drug use: No        Medications:      mometasone (NASONEX) 50 MCG/ACT nasal spray   loratadine (CLARITIN) 10 MG tablet         Review of Systems  Story denies.  Cardiovascular denies.  " Neurologic Per HPI.  GI denies.   per HPI.  Skin per HPI    Physical Exam   BP: 118/79  Heart Rate: 93  Temp: 98.1  F (36.7  C)  Resp: 14  SpO2: 98 %      Physical Exam   Constitutional: He is oriented to person, place, and time. He appears well-developed and well-nourished.   HENT:   Head: Normocephalic and atraumatic.   Neck: Normal range of motion.   Cardiovascular: Normal rate.   Pulmonary/Chest: Effort normal.   Abdominal: Soft. He exhibits no distension. There is no tenderness. There is no rebound.   Genitourinary: Rectum normal, prostate normal and penis normal. No penile tenderness.   Genitourinary Comments: No testicular discomfort.  No penile shaft discomfort.   Musculoskeletal: Normal range of motion.   Ambulating within normal limits.  Able to jump up and down.   Neurological: He is alert and oriented to person, place, and time.   Skin:   No evidence for abscess.  No skin changes.  No erythema.  No fluctuance.  No induration.  No other acute findings noted.     Review of recent presentations.  ED presentations on ,  and elopement on  from outside ED.  Patient has had a negative GC and negative chlamydia and reassuring urinalysis.  No imaging undertaken.  He did have a CAT scan several months ago for colic that was without abnormality.    Medical decision-makin-year-old male with fourth presentation, third time being seen in the past 5 days status post negative GC, negative chlamydia, negative UA presenting today with benign examination and history with concern for perineal discomfort.  Patient's exam shows no abdominal pain,  no penile nor scrotal nor testicular nor skin changes.  Rectal examination benign.  Consideration given to deep abscess though it would appear unlikely and the patient really is not tender and review of systems and history reassuring.  The concerning features the patient's multiple re-presentations.  In light of this, I did consider a pelvic CT looking for a deep  abscess however the exam is really quite benign.  The patient denies any anxiousness.  Long discussion undertaken with nursing present and plan at this time is to discharge home with the understanding that the patient should represent if worsening or persistent symptoms.  He would like to defer CT at this time noting he has had a number of CTs and is aware of the potential for multiple CAT scans increasing risk for cancer.  In light of this concern, the patient was discharged home with the understanding he should represent if any new or concerning symptoms.  Once again examination very reassuring at this juncture.        Discharge Medication List as of 8/26/2019  3:51 PM          Final diagnoses:   Perineal pain in male       8/26/2019   [facility]     Kwame Espinoza MD  08/26/19 [id number]     5

## 2020-02-24 NOTE — DISCHARGE NOTE OB - PATIENT PORTAL LINK FT
You can access the FollowMyHealth Patient Portal offered by [facility] by registering at the following website: [email or web address]. By joining [facility]’s FollowMyHealth portal, you will also be able to view your health information using other applications (apps) compatible with our system.

## 2020-02-24 NOTE — DISCHARGE NOTE OB - MATERIALS PROVIDED
Breastfeeding Mother’s Support Group Information/Guide to Postpartum Care/Shaken Baby Prevention Handout/Breastfeeding Guide and Packet/Back To Sleep Handout/Birth Certificate Instructions/Vaccinations/[facility] Hearing Screen Program/[facility] [place] Screening Program/  Immunization Record/Breastfeeding Log

## 2020-02-24 NOTE — OB PROVIDER H&P - NS_BEFORE39WEEKS_OBGYN_ALL_OB
"Subjective:      Patient ID: Bradley Petty Jr. is a 55 y.o. male.    Chief Complaint: Weakness (x 1 week) and Chills (on and off, x 1 week)    HPI Comments: 54 yo with Patient Active Problem List:     Leukocytopenia, unspecified     Lumbago     Unspecified polyarthropathy or polyarthritis, site unspecified     Primary Sjogren's syndrome     Fibromyalgia     Depressed     HTN (hypertension)     Pain in left shoulder     Cervical radiculitis     Primary open-angle glaucoma(365.11)     Family history of glaucoma     Refractive error     Facial abscess    Here today c/o symptoms starting 2-3 days after abx(bactrim and rifampin) for facial abscess drained by ophthalmology. Fatiqued, nausea, "drained feeling", dyspnea(non exertional), body aches, night sweats, chills. Fatigue worse today.  theraflu seems to help. None since last night.     Review of Systems   Constitutional: Positive for chills and fatigue. Negative for fever.   HENT: Negative for congestion, ear pain and sore throat.    Eyes: Negative for visual disturbance.   Respiratory: Negative for cough and wheezing.    Cardiovascular: Negative for chest pain, palpitations and leg swelling.   Gastrointestinal: Positive for nausea. Negative for abdominal pain, blood in stool, constipation, diarrhea and vomiting.   Genitourinary: Negative for dysuria and hematuria.   Neurological: Negative for seizures, syncope and headaches.     Objective:     Visit Vitals    /80 (BP Location: Right arm, Patient Position: Sitting)    Pulse 97    Temp 97.2 °F (36.2 °C) (Tympanic)    Ht 6' (1.829 m)    Wt 98.7 kg (217 lb 9.5 oz)    SpO2 98%    BMI 29.51 kg/m2       Physical Exam   Constitutional: He is oriented to person, place, and time. He appears well-developed and well-nourished. No distress.   HENT:   Head: Normocephalic and atraumatic.   Mouth/Throat: Oropharynx is clear and moist.   Eyes: EOM are normal. Pupils are equal, round, and reactive to light.   Neck: " Neck supple. No thyromegaly present.   Cardiovascular: Normal rate and regular rhythm.    Pulmonary/Chest: Effort normal and breath sounds normal. He has no wheezes. He has no rales.   Abdominal: Soft. Bowel sounds are normal. There is no tenderness.   Musculoskeletal: He exhibits no edema.   Lymphadenopathy:     He has no cervical adenopathy.   Neurological: He is alert and oriented to person, place, and time.   Skin: Skin is warm and dry.   Psychiatric: He has a normal mood and affect. His behavior is normal.       Assessment:     1. Nausea    2. Fatigue, unspecified type    3. Dyspnea, unspecified type    4. Facial abscess      Plan:   Nausea  -     ondansetron (ZOFRAN-ODT) 8 MG TbDL; Take 1 tablet (8 mg total) by mouth every 8 (eight) hours. Prn n/v  Dispense: 30 tablet; Refill: 0    Fatigue, unspecified type  -     CBC auto differential; Future; Expected date: 2/21/17  -     Comprehensive metabolic panel; Future; Expected date: 2/21/17  -     X-Ray Chest PA And Lateral; Future; Expected date: 2/21/17  -     HIV-1 and HIV-2 antibodies; Future; Expected date: 2/21/17  -     TSH; Future; Expected date: 2/21/17    Dyspnea, unspecified type  -     X-Ray Chest PA And Lateral; Future; Expected date: 2/21/17    Facial abscess    healed    Stop abx    Stay hydrated    Return in about 3 months (around 5/21/2017), or if symptoms worsen or fail to improve.   No

## 2020-02-24 NOTE — OB POSTPARTUM EVENT NOTE - NS_EVENTSUMMARY1_OBGYN_ALL_OB_FT
Pt.'s shock index was over due to an elevated pulse during orthostatics. Pt. is asymptomatic, was able to go to the bathroom and void 800cc.

## 2020-02-24 NOTE — CHART NOTE - NSCHARTNOTEFT_GEN_A_CORE
NP note    Pt seen for increased pressure    Vital Signs Last 24 Hrs  T(C): 36.4 (24 Feb 2020 08:23), Max: 37.0 (24 Feb 2020 06:02)  T(F): 97.52 (24 Feb 2020 08:23), Max: 98.6 (24 Feb 2020 06:02)  HR: 95 (24 Feb 2020 08:53) (54 - 114)  BP: 101/56 (24 Feb 2020 08:46) (87/54 - 120/81)  RR: 18 (24 Feb 2020 04:29) (18 - 19)  SpO2: 88% (24 Feb 2020 08:53) (88% - 100%)  /moderate variability/+ accels/occasional variable  [place] q3-4min  SVE 9/100/-1    -Approved for top off  -D/W Dr. Jewell nino, NP

## 2020-02-24 NOTE — LACTATION INITIAL EVALUATION - INTERVENTION OUTCOME
verbalizes understanding/demonstrates understanding of teaching/good return demonstration/Assisted pt with positioning to facilitate deep latch. Hand expression demonstrated and encouraged. Reviewed feeding on demand, recognizing feeding cues and utilizing feeding log. Safe skin to skin, safe sleep and rooming in promoted.

## 2020-02-24 NOTE — DISCHARGE NOTE OB - MEDICATION SUMMARY - MEDICATIONS TO TAKE
I will START or STAY ON the medications listed below when I get home from the hospital:    ibuprofen 600 mg oral tablet  -- 1 tab(s) by mouth every 6 hours  -- Indication: For pain    acetaminophen 325 mg oral tablet  -- 3 tab(s) by mouth   -- Indication: For pain I will START or STAY ON the medications listed below when I get home from the hospital:    ibuprofen 600 mg oral tablet  -- 1 tab(s) by mouth every 6 hours  -- Indication: For pain    acetaminophen 325 mg oral tablet  -- 3 tab(s) by mouth   -- Indication: For pain    cyclobenzaprine 5 mg oral tablet  -- 1 tab(s) by mouth 3 times a day, As Needed  -- Indication: For neck pain

## 2020-02-24 NOTE — OB PROVIDER DELIVERY SUMMARY - NSPROVIDERDELIVERYNOTE_OBGYN_ALL_OB_FT
Spontaneous vaginal delivery of liveborn infant from NICKO position. Head, shoulders, and body delivered easily. Infant was suctioned. No mec. 1 minute delayed cord clamping was performed. Cord clamped and cut and infant passed to mother. Placenta delivered intact with a 3 vessel cord. Fundal massage was given and uterine fundus was found to be firm. Vaginal exam revealed an intact cervix, vaginal walls, and sulci. Patient had a 1st degree laceration in the perineum that was repaired with 2.0 chromic suture. Excellent hemostasis was noted. Patient was stable. Count was correct x2. EBL 200cc.

## 2020-02-24 NOTE — DISCHARGE NOTE OB - HOSPITAL COURSE
33 yo P2 @ 40+ weeks admitted in labor, Delivered viable female infant apgars 9.9 via . Postpartum course uncomplicated.

## 2020-02-24 NOTE — OB PROVIDER TRIAGE NOTE - NSHPPHYSICALEXAM_GEN_ALL_CORE
VS: 113/66  TOCO: ctx irregular  NST: 130, moderate variability, in progress  SSE: large pooling, BB  VE: 5/90/-3, BB  Sono: vertex

## 2020-02-24 NOTE — OB PROVIDER TRIAGE NOTE - HISTORY OF PRESENT ILLNESS
34 yr old  @ 40, present with LOF @ 3 am, with ctx. [place]rienne VB/LOF. GBS positive, TOLAC, EFW 3600  PNI: TOLAc  Obhx: , FT,  8.5 lbs           , FT  Breech 9.5 lb  Gynhx: denies  Surghx:    Medhx: denies

## 2020-02-24 NOTE — DISCHARGE NOTE OB - CARE PROVIDER_API CALL
Ira Millan (MD)  Obstetrics and Gynecology  [facility] [place] [address]  Phone: [phone]  Fax: [phone]  Follow Up Time:

## 2020-02-24 NOTE — CHART NOTE - NSCHARTNOTEFT_GEN_A_CORE
NP note    Called to room for prolonged decel x4 min.  Pt has mild pressure    Vital Signs Last 24 Hrs  T(C): 37.0 (2020 06:02), Max: 37.0 (2020 06:02)  T(F): 98.6 (2020 06:02), Max: 98.6 (2020 06:02)  HR: 103 (2020 06:59) (54 - 114)  BP: 100/61 (2020 06:59) (87/54 - 113/66)  RR: 18 (2020 04:29) (18 - 19)  SpO2: 100% (2020 06:57) (90% - 100%)  /moderate variability/+ accels/prolonged decel  [place] q3-4min  SVE 7-8/90/-3    ISE placed without incident. Pt tolerated well. Pt repositioned with O2 and IVF    35 y/o  TOLAC pt in active labor     -Continue resuscitation efforts  -For peanut ball  -Continue exp management  -D/W Dr. Jewell nino, NP

## 2020-02-24 NOTE — PROVIDER CONTACT NOTE (OTHER) - ASSESSMENT
SEE Flow sheet for v/s , fundus firm with minimal to mod vaginal bleeding , Pt voided ,no c/o SOB, chest Pain, dizziness ,chest clear  ,no calf tenderness

## 2020-02-25 DIAGNOSIS — O34.219 MATERNAL CARE FOR UNSPECIFIED TYPE SCAR FROM PREVIOUS CESAREAN DELIVERY: ICD-10-CM

## 2020-02-25 RX ORDER — BENZOCAINE AND MENTHOL 5; 1 G/100ML; G/100ML
1 LIQUID ORAL EVERY 4 HOURS
Refills: 0 | Status: DISCONTINUED | OUTPATIENT
Start: 2020-02-25 | End: 2020-02-26

## 2020-02-25 RX ORDER — SENNA PLUS 8.6 MG/1
1 TABLET ORAL
Refills: 0 | Status: DISCONTINUED | OUTPATIENT
Start: 2020-02-25 | End: 2020-02-25

## 2020-02-25 RX ORDER — SENNA PLUS 8.6 MG/1
2 TABLET ORAL AT BEDTIME
Refills: 0 | Status: DISCONTINUED | OUTPATIENT
Start: 2020-02-25 | End: 2020-02-26

## 2020-02-25 RX ADMIN — Medication 600 MILLIGRAM(S): at 21:03

## 2020-02-25 RX ADMIN — Medication 975 MILLIGRAM(S): at 17:40

## 2020-02-25 RX ADMIN — Medication 600 MILLIGRAM(S): at 09:00

## 2020-02-25 RX ADMIN — Medication 600 MILLIGRAM(S): at 21:53

## 2020-02-25 RX ADMIN — Medication 975 MILLIGRAM(S): at 10:23

## 2020-02-25 RX ADMIN — BENZOCAINE AND MENTHOL 1 LOZENGE: 5; 1 LIQUID ORAL at 10:22

## 2020-02-25 RX ADMIN — Medication 1 TABLET(S): at 16:54

## 2020-02-25 RX ADMIN — OXYCODONE HYDROCHLORIDE 5 MILLIGRAM(S): 5 TABLET ORAL at 20:40

## 2020-02-25 RX ADMIN — SODIUM CHLORIDE 3 MILLILITER(S): 9 INJECTION INTRAMUSCULAR; INTRAVENOUS; SUBCUTANEOUS at 17:40

## 2020-02-25 RX ADMIN — Medication 600 MILLIGRAM(S): at 15:00

## 2020-02-25 RX ADMIN — Medication 975 MILLIGRAM(S): at 16:54

## 2020-02-25 RX ADMIN — Medication 600 MILLIGRAM(S): at 08:08

## 2020-02-25 RX ADMIN — OXYCODONE HYDROCHLORIDE 5 MILLIGRAM(S): 5 TABLET ORAL at 20:08

## 2020-02-25 RX ADMIN — Medication 975 MILLIGRAM(S): at 01:11

## 2020-02-25 RX ADMIN — SODIUM CHLORIDE 3 MILLILITER(S): 9 INJECTION INTRAMUSCULAR; INTRAVENOUS; SUBCUTANEOUS at 22:09

## 2020-02-25 RX ADMIN — SENNA PLUS 2 TABLET(S): 8.6 TABLET ORAL at 22:15

## 2020-02-25 RX ADMIN — Medication 600 MILLIGRAM(S): at 14:12

## 2020-02-25 RX ADMIN — SODIUM CHLORIDE 3 MILLILITER(S): 9 INJECTION INTRAMUSCULAR; INTRAVENOUS; SUBCUTANEOUS at 06:20

## 2020-02-25 NOTE — PROGRESS NOTE ADULT - SUBJECTIVE AND OBJECTIVE BOX
OB Progress Note:  PPD#1    S: 35yo PPD#1 s/p . Patient feels well. Pain is well controlled. She is tolerating a regular diet, voiding spontaneously, and ambulating without difficulty. Denies CP/SOB. Denies lightheadedness/dizziness. Denies N/V.     O:  Vital Signs Last 24 Hrs  T(C): 36.9 (2020 21:30), Max: 37.0 (2020 06:02)  HR: 91 (2020 21:30) (54 - 150)  BP: 104/57 (2020 14:17) (87/54 - 120/81)  RR: 17 (2020 21:30) (15 - 18)  SpO2: 100% (2020 21:30) (88% - 100%)    Physical Exam:  General: NAD  Abdomen: soft, non-tender, non-distended, fundus firm  Vaginal: Lochia wnl  Extremities: No erythema/edema    MEDICATIONS  (STANDING):  acetaminophen   Tablet .. 975 milliGRAM(s) Oral <User Schedule>  diphtheria/tetanus/pertussis (acellular) Vaccine (ADAcel) 0.5 milliLiter(s) IntraMuscular once  ibuprofen  Tablet. 600 milliGRAM(s) Oral every 6 hours  prenatal multivitamin 1 Tablet(s) Oral daily  sodium chloride 0.9% lock flush 3 milliLiter(s) IV Push every 8 hours      Labs:  Blood type: O Positive  Rubella IgG: RPR: Negative                          11.1<L>   6.69 >-----------< 218    (  @ 04:00 )             34.4<L>

## 2020-02-25 NOTE — PROGRESS NOTE ADULT - ASSESSMENT
33yo PPD#1 s/p . Patient is stable and doing well post-partum.   - Pain well controlled, continue current pain regimen  - Increase ambulation as tolerated  - Continue regular diet    Odalis Crowley PGY-1  #[id number] 35yo PPD#1 s/p . Patient is stable and doing well post-partum.

## 2020-02-25 NOTE — PROGRESS NOTE ADULT - SUBJECTIVE AND OBJECTIVE BOX
Called to see patient with complaint of sore stiff neck. Arrived on floor about 9:15 am.    Chart review:    Arrived 3 am in labor and delivered 9:15 pm. (6 hours membranes were ruptured.) Uncomplicated delivery. Pushed about 20 minutes.  Uncomplicated epidural placement by the anesthesiologist/CRNA team - on approach. Patient has normal BMI.  No mention on chart nursing, OB, or anesthesia notes about a headache or neck stiffness.    Report from patient's nurse (name pronounced "Cinthya")    Nurse had the patient and an admission last night and resumed care of the patient this morning. Nurse said that the patient arrived on the floor after 11 pm to the post partum floor bed/room from the labor suite. She had a headache and some neck stiffness. This morning around the 7 am shift change the night nurse and her went into the patient's room and she was sleeping.  At 8 am she got a call from the patient that her neck hurts. There was no complaint of headache.    Report from the patient:    Patient agrees that her neck was a little sore last night and her headache was bad. She pointed mostly to the frontal area and then briefly to the occiput. She denies change of vision, photophobia. She says her neck hurts in some positions while supine and definitely when sitting up and standing. She looked into the air to the upper left the side and then hummed and said no when asked if she woke up or has a headache now. This seems to indicate that there is no headache today. Patient says that for a full 20 minutes while pushing the baby out, she had her neck forcefully flexed up. No one held or pushed her head. It was all herself in the flexed position. She said they told her to flex her head while pushing. The patient says that the epidural was placed smoothly. The procedure was not prolonged and it did not seem like the anesthesiologist and CRNA team had any difficulty. She said they seemed very good and the patient seemed pleased with their care. Did not make patient move the neck because did not want to induce pain. Could see the patient not moving/guarding the neck. See an empty food try so patient was able to eat. The patient is comfortably holding the baby in her left arm. She is in the reclined position with the head of the bed up. She is not lying supine.    Impression:    Costochondral from positioning during pushing during the vaginal delivery. The increase in neck pain the next day and lack of headache today is supportive of some increase of inflammation of a neck strain, not a post dural puncture headache.      Plan:    Watchful waiting: Supportive  care with fluids, anti-inflammatory/analgesics, warm packs and positions that the patient is comfortable in and continue to observe. Since probably not a post dural punctue headache, caffeine may not help but would not hurt, so can add if wish to. Tomorrow is the planned discharge day and further more aggressive interventions can be added if there is no improvement, such as a neurology consult if the neck pain/stiffness worsens or a epidural blood patch if the symptoms progress and become more indicative of a post dural puncture (which they are not now.)    Above discussed in full with the patient and the patient agrees at 09:45 am, with the floor nurse ("Mony") and she agrees at 09:45 am, and the OB resident and intern at 09:50 am.    Thank you for this consult and any questions, the OB-anesthesia team can always be reached at [email or web address] phone [phone].

## 2020-02-25 NOTE — PROGRESS NOTE ADULT - PROBLEM SELECTOR PLAN 1
- Pain well controlled, continue current pain regimen  - Increase ambulation as tolerated  - Continue regular diet    Odalis Crowley PGY-1  #[id number]

## 2020-02-26 VITALS
TEMPERATURE: 99 F | RESPIRATION RATE: 17 BRPM | DIASTOLIC BLOOD PRESSURE: 68 MMHG | SYSTOLIC BLOOD PRESSURE: 99 MMHG | OXYGEN SATURATION: 97 % | HEART RATE: 73 BPM

## 2020-02-26 RX ORDER — CYCLOBENZAPRINE HYDROCHLORIDE 10 MG/1
5 TABLET, FILM COATED ORAL ONCE
Refills: 0 | Status: COMPLETED | OUTPATIENT
Start: 2020-02-26 | End: 2020-02-26

## 2020-02-26 RX ADMIN — OXYCODONE HYDROCHLORIDE 5 MILLIGRAM(S): 5 TABLET ORAL at 08:39

## 2020-02-26 RX ADMIN — Medication 600 MILLIGRAM(S): at 12:22

## 2020-02-26 RX ADMIN — Medication 975 MILLIGRAM(S): at 03:00

## 2020-02-26 RX ADMIN — Medication 600 MILLIGRAM(S): at 06:28

## 2020-02-26 RX ADMIN — Medication 600 MILLIGRAM(S): at 12:30

## 2020-02-26 RX ADMIN — CYCLOBENZAPRINE HYDROCHLORIDE 5 MILLIGRAM(S): 10 TABLET, FILM COATED ORAL at 17:07

## 2020-02-26 RX ADMIN — Medication 1 TABLET(S): at 15:55

## 2020-02-26 RX ADMIN — Medication 1 TABLET(S): at 15:00

## 2020-02-26 RX ADMIN — Medication 975 MILLIGRAM(S): at 02:23

## 2020-02-26 RX ADMIN — SODIUM CHLORIDE 3 MILLILITER(S): 9 INJECTION INTRAMUSCULAR; INTRAVENOUS; SUBCUTANEOUS at 17:37

## 2020-02-26 RX ADMIN — Medication 975 MILLIGRAM(S): at 09:00

## 2020-02-26 RX ADMIN — OXYCODONE HYDROCHLORIDE 5 MILLIGRAM(S): 5 TABLET ORAL at 03:00

## 2020-02-26 RX ADMIN — OXYCODONE HYDROCHLORIDE 5 MILLIGRAM(S): 5 TABLET ORAL at 02:23

## 2020-02-26 RX ADMIN — Medication 975 MILLIGRAM(S): at 08:39

## 2020-02-26 NOTE — CONSULT NOTE ADULT - ASSESSMENT
33yo PPD#2 s/p . Patient feels well. Pain is well controlled. She is tolerating a regular diet, voiding spontaneously, and ambulating without difficulty. Neurology was consulted for neck pain/tenderness. as per patient neck pain is exacerbated by neck movement (any direction), improves with maintaining neck in neutral position. Patient denied any weakness, numbness, incontinence. Neurological exam was normal.     Impression     Neck pain most probably musculoskeletal in nature (no weakness, numbness, incontinence)     Plan    Antispasmodic as permitted by breast feeding  no imaging needed at this time   If pain persist, she can follow with neurology

## 2020-02-26 NOTE — CONSULT NOTE ADULT - SUBJECTIVE AND OBJECTIVE BOX
HPI:    33yo PPD#2 s/p . Patient feels well. Pain is well controlled. She is tolerating a regular diet, voiding spontaneously, and ambulating without difficulty. Neurology was consulted for neck pain/tenderness. as per patient neck pain is exacerbated by neck movement (any direction), improves with maintaining neck in neutral position. Patient denied any weakness, numbness, incontinence.     MEDICATIONS  (STANDING):  diphtheria/tetanus/pertussis (acellular) Vaccine (ADAcel) 0.5 milliLiter(s) IntraMuscular once  ibuprofen  Tablet. 600 milliGRAM(s) Oral every 6 hours  prenatal multivitamin 1 Tablet(s) Oral daily  senna 2 Tablet(s) Oral at bedtime  sodium chloride 0.9% lock flush 3 milliLiter(s) IV Push every 8 hours    MEDICATIONS  (PRN):  acetaminophen 325 mG/butalbital 50 mG/caffeine 40 mG 1 Tablet(s) Oral every 4 hours PRN spinal headache  benzocaine 15 mG/menthol 3.6 mG (Sugar-Free) Lozenge 1 Lozenge Oral every 4 hours PRN Sore Throat  benzocaine 20%/menthol 0.5% Spray 1 Spray(s) Topical every 6 hours PRN for Perineal discomfort  dibucaine 1% Ointment 1 Application(s) Topical every 6 hours PRN Perineal discomfort  diphenhydrAMINE 25 milliGRAM(s) Oral every 6 hours PRN Pruritus  glycerin Suppository - Adult 1 Suppository(s) Rectal at bedtime PRN Constipation  hydrocortisone 1% Cream 1 Application(s) Topical every 6 hours PRN Moderate Pain (4-6)  lanolin Ointment 1 Application(s) Topical every 6 hours PRN nipple soreness  magnesium hydroxide Suspension 30 milliLiter(s) Oral two times a day PRN Constipation  oxyCODONE    IR 5 milliGRAM(s) Oral every 3 hours PRN Moderate to Severe Pain (4-10)  oxyCODONE    IR 5 milliGRAM(s) Oral once PRN Moderate to Severe Pain (4-10)  pramoxine 1%/zinc 5% Cream 1 Application(s) Topical every 4 hours PRN Moderate Pain (4-6)  simethicone 80 milliGRAM(s) Chew every 4 hours PRN Gas  witch hazel Pads 1 Application(s) Topical every 4 hours PRN Perineal discomfort      PAST MEDICAL & SURGICAL HISTORY:  Vaginal delivery:  FT 4/15/2012 8#5  Dysautonomia  Previous  section: C/S FT 7/15/2014 9#5 breech      FAMILY HISTORY:  Family history of early CAD      Allergies    Milk (Hives)  No Known Drug Allergies  peanuts (Hives)  Tomatoes (Hives)    Intolerances          SHx - No smoking, No ETOH, No drug abuse      Review of Systems:  CONSTITUTIONAL:  No weight loss, fever, chills, weakness or fatigue.  HEENT:  Eyes:  No visual loss, blurred vision, double vision or yellow sclerae. Ears, Nose, Throat:  No hearing loss, sneezing, congestion, runny nose or sore throat.  SKIN:  No rash or itching.  CARDIOVASCULAR:  No chest pain, chest pressure or chest discomfort. No palpitations or edema.  RESPIRATORY:  No shortness of breath, cough or sputum.  GASTROINTESTINAL:  No anorexia, nausea, vomiting or diarrhea. No abdominal pain or blood.  GENITOURINARY:  NO Burning on urination.   NEUROLOGICAL: See HPI  MUSCULOSKELETAL:  No muscle, back pain, joint pain or stiffness.  HEMATOLOGIC:  No anemia, bleeding or bruising.  LYMPHATICS:  No enlarged nodes. No history of splenectomy.  PSYCHIATRIC:  No history of depression or anxiety.  ENDOCRINOLOGIC:  No reports of sweating, cold or heat intolerance. No polyuria or polydipsia.  ALLERGIES:  No history of asthma, hives, eczema or rhinitis.        Vital Signs Last 24 Hrs  T(C): 36.4 (2020 05:41), Max: 36.6 (2020 17:50)  T(F): 97.6 (2020 05:41), Max: 97.9 (2020 17:50)  HR: 63 (2020 05:41) (61 - 75)  BP: 100/65 (2020 05:41) (100/65 - 102/66)  BP(mean): --  RR: 18 (2020 05:41) (17 - 18)  SpO2: 100% (2020 05:41) (99% - 100%)    General Exam:   General appearance: No acute distress                   Neurological Exam:    Mental Status: Orientated to self, date and place.  Attention intact.  No dysarthria, aphasia or neglect.  Cranial Nerves: PERRL, EOMI, CN V1-3 intact to light touch and pinprick.  No facial asymmetry, Tongue, uvula and palate midline.    Motor:   Tone: normal.                  Strength:   intact without any drifts   Dysmetria: None to finger-nose-finger or heel-shin-heel  No truncal ataxia.    Tremor: No resting, postural or action tremor.  No myoclonus.  Sensation: intact to light touch  Deep Tendon Reflexes: 2+ bilateral biceps, triceps, brachioradialis, knee and ankle  Toes flexor bilaterally  Gait: normal and stable.      Other:                    Radiology    -

## 2020-02-26 NOTE — PROGRESS NOTE ADULT - SUBJECTIVE AND OBJECTIVE BOX
OB Progress Note:  PPD#2    S: 33yo PPD#2 s/p . Patient feels well. Pain is well controlled. She is tolerating a regular diet, voiding spontaneously, and ambulating without difficulty. Denies CP/SOB. Denies lightheadedness/dizziness. Denies N/V.     O:  Vital Signs Last 24 Hrs  T(C): 36.3 (2020 02:02), Max: 36.6 (2020 17:50)  HR: 61 (2020 02:02) (61 - 85)  BP: 102/66 (2020 02:02) (102/65 - 102/66)  RR: 18 (2020 02:02) (17 - 18)  SpO2: 100% (2020 02:02) (99% - 100%)    Physical Exam:  General: NAD  Abdomen: soft, non-tender, non-distended, fundus firm  Vaginal: Lochia wnl  Extremities: No erythema/edema    MEDICATIONS  (STANDING):  acetaminophen   Tablet .. 975 milliGRAM(s) Oral <User Schedule>  diphtheria/tetanus/pertussis (acellular) Vaccine (ADAcel) 0.5 milliLiter(s) IntraMuscular once  ibuprofen  Tablet. 600 milliGRAM(s) Oral every 6 hours  prenatal multivitamin 1 Tablet(s) Oral daily  senna 2 Tablet(s) Oral at bedtime  sodium chloride 0.9% lock flush 3 milliLiter(s) IV Push every 8 hours    MEDICATIONS  (PRN):  benzocaine 15 mG/menthol 3.6 mG (Sugar-Free) Lozenge 1 Lozenge Oral every 4 hours PRN Sore Throat  benzocaine 20%/menthol 0.5% Spray 1 Spray(s) Topical every 6 hours PRN for Perineal discomfort  dibucaine 1% Ointment 1 Application(s) Topical every 6 hours PRN Perineal discomfort  diphenhydrAMINE 25 milliGRAM(s) Oral every 6 hours PRN Pruritus  glycerin Suppository - Adult 1 Suppository(s) Rectal at bedtime PRN Constipation  hydrocortisone 1% Cream 1 Application(s) Topical every 6 hours PRN Moderate Pain (4-6)  lanolin Ointment 1 Application(s) Topical every 6 hours PRN nipple soreness  magnesium hydroxide Suspension 30 milliLiter(s) Oral two times a day PRN Constipation  oxyCODONE    IR 5 milliGRAM(s) Oral every 3 hours PRN Moderate to Severe Pain (4-10)  oxyCODONE    IR 5 milliGRAM(s) Oral once PRN Moderate to Severe Pain (4-10)  pramoxine 1%/zinc 5% Cream 1 Application(s) Topical every 4 hours PRN Moderate Pain (4-6)  simethicone 80 milliGRAM(s) Chew every 4 hours PRN Gas  witch hazel Pads 1 Application(s) Topical every 4 hours PRN Perineal discomfort      Labs:  Blood type: O Positive  Rubella IgG: RPR: Negative                          11.1<L>   6.69 >-----------< 218    (  @ 04:00 )             34.4<L>          A/P: 33yo PPD#2 s/p . Patient is stable and doing well post-partum.    - Pain well controlled, continue current pain regimen  - Increase ambulation as tolerated  - Continue regular diet  - Discharge planning     Odalis Crowley PGY-1  #[id number] OB Progress Note:  PPD#2    S: 35yo PPD#2 s/p . Patient feels well. Pain is well controlled. She is tolerating a regular diet, voiding spontaneously, and ambulating without difficulty. Denies CP/SOB. Denies lightheadedness/dizziness. Denies N/V.     O:  Vital Signs Last 24 Hrs  T(C): 36.3 (2020 02:02), Max: 36.6 (2020 17:50)  HR: 61 (2020 02:02) (61 - 85)  BP: 102/66 (2020 02:02) (102/65 - 102/66)  RR: 18 (2020 02:02) (17 - 18)  SpO2: 100% (2020 02:02) (99% - 100%)    Physical Exam:  General: NAD  Abdomen: soft, non-tender, non-distended, fundus firm  Vaginal: Lochia wnl  Extremities: No erythema/edema    MEDICATIONS  (STANDING):  acetaminophen   Tablet .. 975 milliGRAM(s) Oral <User Schedule>  diphtheria/tetanus/pertussis (acellular) Vaccine (ADAcel) 0.5 milliLiter(s) IntraMuscular once  ibuprofen  Tablet. 600 milliGRAM(s) Oral every 6 hours  prenatal multivitamin 1 Tablet(s) Oral daily  senna 2 Tablet(s) Oral at bedtime  sodium chloride 0.9% lock flush 3 milliLiter(s) IV Push every 8 hours    MEDICATIONS  (PRN):  benzocaine 15 mG/menthol 3.6 mG (Sugar-Free) Lozenge 1 Lozenge Oral every 4 hours PRN Sore Throat  benzocaine 20%/menthol 0.5% Spray 1 Spray(s) Topical every 6 hours PRN for Perineal discomfort  dibucaine 1% Ointment 1 Application(s) Topical every 6 hours PRN Perineal discomfort  diphenhydrAMINE 25 milliGRAM(s) Oral every 6 hours PRN Pruritus  glycerin Suppository - Adult 1 Suppository(s) Rectal at bedtime PRN Constipation  hydrocortisone 1% Cream 1 Application(s) Topical every 6 hours PRN Moderate Pain (4-6)  lanolin Ointment 1 Application(s) Topical every 6 hours PRN nipple soreness  magnesium hydroxide Suspension 30 milliLiter(s) Oral two times a day PRN Constipation  oxyCODONE    IR 5 milliGRAM(s) Oral every 3 hours PRN Moderate to Severe Pain (4-10)  oxyCODONE    IR 5 milliGRAM(s) Oral once PRN Moderate to Severe Pain (4-10)  pramoxine 1%/zinc 5% Cream 1 Application(s) Topical every 4 hours PRN Moderate Pain (4-6)  simethicone 80 milliGRAM(s) Chew every 4 hours PRN Gas  witch hazel Pads 1 Application(s) Topical every 4 hours PRN Perineal discomfort      Labs:  Blood type: O Positive  Rubella IgG: RPR: Negative                          11.1<L>   6.69 >-----------< 218    (  @ 04:00 )             34.4<L>

## 2020-02-26 NOTE — PROGRESS NOTE ADULT - PROBLEM SELECTOR PLAN 1
- Pain well controlled, continue current pain regimen  - Increase ambulation as tolerated  - Continue regular diet  - Discharge planning     Odalis Crowley PGY-1  #[id number]

## 2020-02-26 NOTE — PROGRESS NOTE ADULT - SUBJECTIVE AND OBJECTIVE BOX
Pt still complains of moderate neck discomfort.  No headache or other symptoms of dural pucture.  Pt states that she may have hurt her neck while pushing.  She is advised to continue warm compresses and avoid lifting and bending.  She may return to L&D at any time if symptoms don't improve

## 2020-03-05 ENCOUNTER — APPOINTMENT (OUTPATIENT)
Dept: INTERNAL MEDICINE | Facility: CLINIC | Age: 35
End: 2020-03-05

## 2020-03-06 ENCOUNTER — APPOINTMENT (OUTPATIENT)
Dept: INTERNAL MEDICINE | Facility: CLINIC | Age: 35
End: 2020-03-06
Payer: COMMERCIAL

## 2020-03-06 VITALS
WEIGHT: 138 LBS | HEIGHT: 64 IN | DIASTOLIC BLOOD PRESSURE: 70 MMHG | SYSTOLIC BLOOD PRESSURE: 108 MMHG | HEART RATE: 98 BPM | TEMPERATURE: 98.2 F | OXYGEN SATURATION: 99 % | BODY MASS INDEX: 23.56 KG/M2

## 2020-03-06 PROCEDURE — 99213 OFFICE O/P EST LOW 20 MIN: CPT

## 2020-06-02 ENCOUNTER — APPOINTMENT (OUTPATIENT)
Dept: INTERNAL MEDICINE | Facility: CLINIC | Age: 35
End: 2020-06-02
Payer: COMMERCIAL

## 2020-06-02 PROCEDURE — 99213 OFFICE O/P EST LOW 20 MIN: CPT | Mod: 95

## 2020-06-02 RX ORDER — OSELTAMIVIR PHOSPHATE 75 MG/1
75 CAPSULE ORAL
Qty: 7 | Refills: 0 | Status: DISCONTINUED | COMMUNITY
Start: 2020-01-13 | End: 2020-06-02

## 2020-06-02 RX ORDER — CYCLOBENZAPRINE HYDROCHLORIDE 5 MG/1
5 TABLET, FILM COATED ORAL 3 TIMES DAILY
Qty: 15 | Refills: 0 | Status: DISCONTINUED | COMMUNITY
Start: 2020-02-26 | End: 2020-06-02

## 2020-06-02 NOTE — PLAN
[FreeTextEntry1] :  20  minutes minimal was spent with patient and >50% of the time spent in the encounter involved counseling and coordination of care for any and all diagnosis submitted.\par \par

## 2020-06-02 NOTE — HISTORY OF PRESENT ILLNESS
[Home] : at home, [unfilled] , at the time of the visit. [Medical Office: ([facility])___] : at the medical office located in  [Verbal consent obtained from patient] : the patient, [unfilled] [FreeTextEntry8] : CC: pain- feet, hings\par pain - all the fingertips 1 yr- every few weeks to months.- not related to activity, temp, -duration 1-2 days.  no allev or aggrav factors\par no chg in color of hand.  tingling in feet and hands\par more than 1 yr- sharp , shooting pain - foot- R or L foot- 4 th MT- R.  L foot pain over MT- T jt.\par doesn't wear hi heels. intermittent pain- sometimes no pain for the entire week\par pt c/o bruising over her thighs- no know reason, no trauma\par post partum- 20- pt taking pre  MVI

## 2020-07-14 RX ORDER — CYCLOBENZAPRINE HYDROCHLORIDE 10 MG/1
1 TABLET, FILM COATED ORAL
Qty: 0 | Refills: 0 | DISCHARGE

## 2020-07-15 ENCOUNTER — LABORATORY RESULT (OUTPATIENT)
Age: 35
End: 2020-07-15

## 2020-08-07 ENCOUNTER — LABORATORY RESULT (OUTPATIENT)
Age: 35
End: 2020-08-07

## 2020-09-18 ENCOUNTER — APPOINTMENT (OUTPATIENT)
Dept: INTERNAL MEDICINE | Facility: CLINIC | Age: 35
End: 2020-09-18
Payer: MEDICAID

## 2020-09-18 PROBLEM — G90.9 DISORDER OF THE AUTONOMIC NERVOUS SYSTEM, UNSPECIFIED: Chronic | Status: ACTIVE | Noted: 2018-06-23

## 2020-09-18 PROCEDURE — 99212 OFFICE O/P EST SF 10 MIN: CPT | Mod: 95

## 2020-09-18 NOTE — HISTORY OF PRESENT ILLNESS
[FreeTextEntry8] : CC;  lump on arm\par started  3-4 mo lump on R forearm.  no pain. no injury\par no wgt loss.  no itching. \par pt not sure if it has  chg in size.  pt states it feels hard.  pt has not used any treatment for it\par doesn't have any other lesions\par pt has 6mo old baby\par elev PTT- pt still has not f/u w heme [Home] : at home, [unfilled] , at the time of the visit. [Medical Office: ([facility])___] : at the medical office located in  [Verbal consent obtained from patient] : the patient, [unfilled]

## 2020-09-18 NOTE — HISTORY OF PRESENT ILLNESS
[FreeTextEntry8] : CC;  lump on arm\par started  3-4 mo lump on R forearm.  no pain. no injury\par no wgt loss.  no itching. \par pt not sure if it has  chg in size.  pt states it feels hard.  pt has not used any treatment for it\par doesn't have any other lesions\par pt has 6mo old baby\par elev PTT- pt still has not f/u w heme [Home] : at home, [unfilled] , at the time of the visit. [Medical Office: ([facility])___] : at the medical office located in  [Verbal consent obtained from patient] : the patient, [unfilled]

## 2020-09-18 NOTE — PLAN
[FreeTextEntry1] : subcutaneous lesion- try heat BID. cream.  f/u in 3 wk if not resolved\par elev PTTrec f/u w heme\par rec flu vaccine\par 11   minutes minimal was spent with patient and >50% of the time spent in the encounter involved counseling and coordination of care for any and all diagnosis submitted.\par \par

## 2020-12-03 ENCOUNTER — APPOINTMENT (OUTPATIENT)
Dept: INTERNAL MEDICINE | Facility: CLINIC | Age: 35
End: 2020-12-03

## 2021-03-11 LAB
BASOPHILS # BLD AUTO: 0.03 K/UL
BASOPHILS NFR BLD AUTO: 0.4 %
CHOLEST SERPL-MCNC: 230 MG/DL
EOSINOPHIL # BLD AUTO: 0.13 K/UL
EOSINOPHIL NFR BLD AUTO: 1.9 %
HCT VFR BLD CALC: 39.2 %
HDLC SERPL-MCNC: 45 MG/DL
HGB BLD-MCNC: 12.2 G/DL
IMM GRANULOCYTES NFR BLD AUTO: 0.1 %
LDLC SERPL CALC-MCNC: 145 MG/DL
LDLC SERPL DIRECT ASSAY-MCNC: 154 MG/DL
LYMPHOCYTES # BLD AUTO: 2.23 K/UL
LYMPHOCYTES NFR BLD AUTO: 32.8 %
MAN DIFF?: NORMAL
MCHC RBC-ENTMCNC: 28.6 PG
MCHC RBC-ENTMCNC: 31.1 GM/DL
MCV RBC AUTO: 92 FL
MONOCYTES # BLD AUTO: 0.41 K/UL
MONOCYTES NFR BLD AUTO: 6 %
NEUTROPHILS # BLD AUTO: 3.99 K/UL
NEUTROPHILS NFR BLD AUTO: 58.8 %
NONHDLC SERPL-MCNC: 184 MG/DL
PLATELET # BLD AUTO: 333 K/UL
RBC # BLD: 4.26 M/UL
RBC # FLD: 13.3 %
SAVE SPECIMEN: NORMAL
T4 FREE SERPL-MCNC: 1.1 NG/DL
TRIGL SERPL-MCNC: 197 MG/DL
TSH SERPL-ACNC: 2.3 UIU/ML
WBC # FLD AUTO: 6.8 K/UL

## 2021-03-15 LAB
ALBUMIN SERPL ELPH-MCNC: 4.5 G/DL
ALP BLD-CCNC: 59 U/L
ALT SERPL-CCNC: 10 U/L
ANION GAP SERPL CALC-SCNC: 9 MMOL/L
AST SERPL-CCNC: 15 U/L
BILIRUB SERPL-MCNC: 0.2 MG/DL
BUN SERPL-MCNC: 15 MG/DL
CALCIUM SERPL-MCNC: 9.2 MG/DL
CHLORIDE SERPL-SCNC: 104 MMOL/L
CO2 SERPL-SCNC: 29 MMOL/L
CREAT SERPL-MCNC: 0.56 MG/DL
ESTIMATED AVERAGE GLUCOSE: 108 MG/DL
GLUCOSE SERPL-MCNC: 104 MG/DL
HBA1C MFR BLD HPLC: 5.4 %
POTASSIUM SERPL-SCNC: 4.5 MMOL/L
PROT SERPL-MCNC: 7 G/DL
SODIUM SERPL-SCNC: 142 MMOL/L

## 2021-03-16 ENCOUNTER — NON-APPOINTMENT (OUTPATIENT)
Age: 36
End: 2021-03-16

## 2021-05-13 ENCOUNTER — NON-APPOINTMENT (OUTPATIENT)
Age: 36
End: 2021-05-13

## 2021-05-14 ENCOUNTER — APPOINTMENT (OUTPATIENT)
Dept: INTERNAL MEDICINE | Facility: CLINIC | Age: 36
End: 2021-05-14

## 2021-06-02 ENCOUNTER — APPOINTMENT (OUTPATIENT)
Dept: INTERNAL MEDICINE | Facility: CLINIC | Age: 36
End: 2021-06-02
Payer: COMMERCIAL

## 2021-06-02 VITALS
HEART RATE: 79 BPM | BODY MASS INDEX: 20.14 KG/M2 | TEMPERATURE: 97.9 F | SYSTOLIC BLOOD PRESSURE: 118 MMHG | OXYGEN SATURATION: 98 % | DIASTOLIC BLOOD PRESSURE: 80 MMHG | HEIGHT: 64 IN | WEIGHT: 118 LBS

## 2021-06-02 DIAGNOSIS — I83.90 ASYMPTOMATIC VARICOSE VEINS OF UNSPECIFIED LOWER EXTREMITY: ICD-10-CM

## 2021-06-02 DIAGNOSIS — M79.669 PAIN IN UNSPECIFIED LOWER LEG: ICD-10-CM

## 2021-06-02 DIAGNOSIS — G62.9 POLYNEUROPATHY, UNSPECIFIED: ICD-10-CM

## 2021-06-02 PROCEDURE — 99214 OFFICE O/P EST MOD 30 MIN: CPT | Mod: 25

## 2021-06-02 PROCEDURE — 36415 COLL VENOUS BLD VENIPUNCTURE: CPT

## 2021-06-02 RX ORDER — TRIAMCINOLONE ACETONIDE 0.25 MG/G
0.03 CREAM TOPICAL 3 TIMES DAILY
Qty: 30 | Refills: 0 | Status: DISCONTINUED | COMMUNITY
Start: 2020-09-18 | End: 2021-06-02

## 2021-06-02 RX ORDER — FOLIC ACID/MULTIVIT,IRON,MINER 0.4MG-18MG
200 TABLET ORAL
Qty: 90 | Refills: 3 | Status: DISCONTINUED | COMMUNITY
Start: 2019-08-01 | End: 2021-06-02

## 2021-06-02 RX ORDER — CHROMIUM 200 MCG
TABLET ORAL
Refills: 0 | Status: ACTIVE | COMMUNITY

## 2021-06-02 RX ORDER — PNV NO.1/IRON,CARB/DOCUS/FOLIC 90-50-1MG
TABLET ORAL
Refills: 0 | Status: DISCONTINUED | COMMUNITY
End: 2021-06-02

## 2021-06-02 NOTE — PLAN
[FreeTextEntry1] : calf pain- moves around.  pt concerned about DVT but unlikely\par anxiety- rec therapy - pt refused\par rec COVID vac- pt refused

## 2021-06-02 NOTE — HISTORY OF PRESENT ILLNESS
[FreeTextEntry8] : CC: leg , foot pain\par L calf pain- 2 mo.  worse at night. no trauma.  has knee and ankle pain.  not taking OCP. no travel or long road trips.  has been home w 1 yr old. sometimes sudden sharp , and sometimes achy pain- duration all day.  no alleviating or aggravating factors.  not taking anything for the pain.  calf pain moves around-\par burning, tingling sensation in her feet\par L wrist pain- 1 yr- pain on palpation.  no trauma. occasional pain w movement of wrist.  pain is better- prev pain on lifting objt.\par \par lump in arm -09/2020- resolved\par elev PTT - reviewed labs8/2020- never f/u w Heme.  no miscarriage.\par elev chol- reviewed 3/10/21 labs w pt .  pt eating a lot of butter, glee to gain wgt\par reviewed 3/10/21 labs w pt

## 2021-06-03 LAB
25(OH)D3 SERPL-MCNC: 38.9 NG/ML
APTT BLD: 36.7 SEC
BASOPHILS # BLD AUTO: 0.04 K/UL
BASOPHILS NFR BLD AUTO: 0.7 %
EOSINOPHIL # BLD AUTO: 0.11 K/UL
EOSINOPHIL NFR BLD AUTO: 1.9 %
HCT VFR BLD CALC: 40.7 %
HGB BLD-MCNC: 12.7 G/DL
IMM GRANULOCYTES NFR BLD AUTO: 0.2 %
INR PPP: 0.97 RATIO
LYMPHOCYTES # BLD AUTO: 1.89 K/UL
LYMPHOCYTES NFR BLD AUTO: 33.2 %
MAN DIFF?: NORMAL
MCHC RBC-ENTMCNC: 28.5 PG
MCHC RBC-ENTMCNC: 31.2 GM/DL
MCV RBC AUTO: 91.3 FL
MONOCYTES # BLD AUTO: 0.53 K/UL
MONOCYTES NFR BLD AUTO: 9.3 %
NEUTROPHILS # BLD AUTO: 3.11 K/UL
NEUTROPHILS NFR BLD AUTO: 54.7 %
PLATELET # BLD AUTO: 372 K/UL
PT BLD: 11.5 SEC
RBC # BLD: 4.46 M/UL
RBC # FLD: 13.5 %
SAVE SPECIMEN: NORMAL
T4 FREE SERPL-MCNC: 1.1 NG/DL
TSH SERPL-ACNC: 2.55 UIU/ML
VIT B12 SERPL-MCNC: 1145 PG/ML
WBC # FLD AUTO: 5.69 K/UL

## 2021-07-14 ENCOUNTER — APPOINTMENT (OUTPATIENT)
Dept: INTERNAL MEDICINE | Facility: CLINIC | Age: 36
End: 2021-07-14

## 2021-10-14 ENCOUNTER — NON-APPOINTMENT (OUTPATIENT)
Age: 36
End: 2021-10-14

## 2021-10-22 ENCOUNTER — OUTPATIENT (OUTPATIENT)
Dept: OUTPATIENT SERVICES | Facility: HOSPITAL | Age: 36
LOS: 1 days | Discharge: HOME | End: 2021-10-22

## 2021-10-22 ENCOUNTER — NON-APPOINTMENT (OUTPATIENT)
Age: 36
End: 2021-10-22

## 2021-10-22 ENCOUNTER — APPOINTMENT (OUTPATIENT)
Dept: INTERNAL MEDICINE | Facility: CLINIC | Age: 36
End: 2021-10-22
Payer: COMMERCIAL

## 2021-10-22 VITALS
SYSTOLIC BLOOD PRESSURE: 96 MMHG | HEART RATE: 91 BPM | WEIGHT: 120 LBS | BODY MASS INDEX: 20.49 KG/M2 | OXYGEN SATURATION: 98 % | HEIGHT: 64 IN | TEMPERATURE: 98 F | DIASTOLIC BLOOD PRESSURE: 62 MMHG

## 2021-10-22 DIAGNOSIS — R74.8 ABNORMAL LEVELS OF OTHER SERUM ENZYMES: ICD-10-CM

## 2021-10-22 DIAGNOSIS — Z34.93 ENCOUNTER FOR SUPERVISION OF NORMAL PREGNANCY, UNSPECIFIED, THIRD TRIMESTER: ICD-10-CM

## 2021-10-22 DIAGNOSIS — R73.03 PREDIABETES.: ICD-10-CM

## 2021-10-22 DIAGNOSIS — O99.810 ABNORMAL GLUCOSE COMPLICATING PREGNANCY: ICD-10-CM

## 2021-10-22 DIAGNOSIS — Z98.891 HISTORY OF UTERINE SCAR FROM PREVIOUS SURGERY: Chronic | ICD-10-CM

## 2021-10-22 DIAGNOSIS — Z20.828 CONTACT WITH AND (SUSPECTED) EXPOSURE TO OTHER VIRAL COMMUNICABLE DISEASES: ICD-10-CM

## 2021-10-22 DIAGNOSIS — R79.9 ABNORMAL FINDING OF BLOOD CHEMISTRY, UNSPECIFIED: ICD-10-CM

## 2021-10-22 DIAGNOSIS — R22.30 LOCALIZED SWELLING, MASS AND LUMP, UNSPECIFIED UPPER LIMB: ICD-10-CM

## 2021-10-22 DIAGNOSIS — Z34.92 ENCOUNTER FOR SUPERVISION OF NORMAL PREGNANCY, UNSPECIFIED, SECOND TRIMESTER: ICD-10-CM

## 2021-10-22 DIAGNOSIS — R22.31 LOCALIZED SWELLING, MASS AND LUMP, RIGHT UPPER LIMB: ICD-10-CM

## 2021-10-22 DIAGNOSIS — Z34.91 ENCOUNTER FOR SUPERVISION OF NORMAL PREGNANCY, UNSPECIFIED, FIRST TRIMESTER: ICD-10-CM

## 2021-10-22 DIAGNOSIS — M25.532 PAIN IN LEFT WRIST: ICD-10-CM

## 2021-10-22 DIAGNOSIS — M79.673 PAIN IN UNSPECIFIED FOOT: ICD-10-CM

## 2021-10-22 PROCEDURE — 99395 PREV VISIT EST AGE 18-39: CPT | Mod: 25

## 2021-10-22 PROCEDURE — 93000 ELECTROCARDIOGRAM COMPLETE: CPT

## 2021-10-22 RX ORDER — PNV NO.95/FERROUS FUM/FOLIC AC 28MG-0.8MG
TABLET ORAL
Refills: 0 | Status: DISCONTINUED | COMMUNITY
End: 2021-10-22

## 2021-10-22 NOTE — HISTORY OF PRESENT ILLNESS
[FreeTextEntry1] : CPE [de-identified] : vaccine- refused flu , COVID vac\par diet- vegetarian\par exercise- no\par elev PTT- rec f/u w Heme- has appt on Mon\par elev chol- has improved her diet\par L wrist pain- never f/u Rehab or xray.  no pain unless she touches the lateral bony promin\par L calf pain-resolved\par Gerd- rare- every few wk w certain foods.\par heavy period- all her life.  has hx of fibroids. \par anxiety - rec therapy\par 3/11/19 EGD-nl

## 2021-10-22 NOTE — HEALTH RISK ASSESSMENT
[Patient reported colonoscopy was normal] : Patient reported colonoscopy was normal [Never (0 pts)] : Never (0 points) [No] : In the past 12 months have you used drugs other than those required for medical reasons? No [0] : 2) Feeling down, depressed, or hopeless: Not at all (0) [PHQ-2 Negative - No further assessment needed] : PHQ-2 Negative - No further assessment needed [HIV test declined] : HIV test declined [Hepatitis C test declined] : Hepatitis C test declined [Designated Healthcare Proxy] : Designated healthcare proxy [Relationship: ___] : Relationship: [unfilled] [] : No [Audit-CScore] : 0 [[id number]] : 0 [PapSmearDate] : 2019 [ColonoscopyDate] : 03/11/19 [ColonoscopyComments] : hemorrhoid [de-identified] : wear glasses- seen ophth 1 yr ago [de-identified] : last seen dentist 6 mo ago [AdvancecareDate] : 10/21

## 2021-10-22 NOTE — PHYSICAL EXAM
[No Acute Distress] : no acute distress [Well Nourished] : well nourished [Well Developed] : well developed [Well-Appearing] : well-appearing [Normal Sclera/Conjunctiva] : normal sclera/conjunctiva [PERRL] : pupils equal round and reactive to light [Normal Outer Ear/Nose] : the outer ears and nose were normal in appearance [Normal Oropharynx] : the oropharynx was normal [Normal TMs] : both tympanic membranes were normal [No Lymphadenopathy] : no lymphadenopathy [Supple] : supple [Thyroid Normal, No Nodules] : the thyroid was normal and there were no nodules present [No Respiratory Distress] : no respiratory distress  [No Accessory Muscle Use] : no accessory muscle use [Clear to Auscultation] : lungs were clear to auscultation bilaterally [Normal Rate] : normal rate  [Regular Rhythm] : with a regular rhythm [Normal S1, S2] : normal S1 and S2 [No Murmur] : no murmur heard [No Edema] : there was no peripheral edema [Soft] : abdomen soft [Non Tender] : non-tender [Non-distended] : non-distended [No Masses] : no abdominal mass palpated [Normal Bowel Sounds] : normal bowel sounds [Normal Supraclavicular Nodes] : no supraclavicular lymphadenopathy [Normal Axillary Nodes] : no axillary lymphadenopathy [Normal Posterior Cervical Nodes] : no posterior cervical lymphadenopathy [Normal Anterior Cervical Nodes] : no anterior cervical lymphadenopathy [Normal Inguinal Nodes] : no inguinal lymphadenopathy [Grossly Normal Strength/Tone] : grossly normal strength/tone [No Focal Deficits] : no focal deficits [Normal Gait] : normal gait [Normal Affect] : the affect was normal [Normal Insight/Judgement] : insight and judgment were intact

## 2021-10-25 ENCOUNTER — RESULT REVIEW (OUTPATIENT)
Age: 36
End: 2021-10-25

## 2021-10-25 ENCOUNTER — NON-APPOINTMENT (OUTPATIENT)
Age: 36
End: 2021-10-25

## 2021-10-25 ENCOUNTER — APPOINTMENT (OUTPATIENT)
Dept: HEMATOLOGY ONCOLOGY | Facility: CLINIC | Age: 36
End: 2021-10-25
Payer: COMMERCIAL

## 2021-10-25 ENCOUNTER — LABORATORY RESULT (OUTPATIENT)
Age: 36
End: 2021-10-25

## 2021-10-25 VITALS
BODY MASS INDEX: 20.06 KG/M2 | HEART RATE: 105 BPM | HEIGHT: 64.96 IN | RESPIRATION RATE: 14 BRPM | TEMPERATURE: 97.2 F | DIASTOLIC BLOOD PRESSURE: 72 MMHG | SYSTOLIC BLOOD PRESSURE: 104 MMHG | OXYGEN SATURATION: 98 % | WEIGHT: 120.37 LBS

## 2021-10-25 DIAGNOSIS — R79.1 ABNORMAL COAGULATION PROFILE: ICD-10-CM

## 2021-10-25 DIAGNOSIS — R23.8 OTHER SKIN CHANGES: ICD-10-CM

## 2021-10-25 LAB
BASOPHILS # BLD AUTO: 0.04 K/UL — SIGNIFICANT CHANGE UP (ref 0–0.2)
BASOPHILS NFR BLD AUTO: 0.6 % — SIGNIFICANT CHANGE UP (ref 0–2)
EOSINOPHIL # BLD AUTO: 0.14 K/UL — SIGNIFICANT CHANGE UP (ref 0–0.5)
EOSINOPHIL NFR BLD AUTO: 2.2 % — SIGNIFICANT CHANGE UP (ref 0–6)
HCT VFR BLD CALC: 40 % — SIGNIFICANT CHANGE UP (ref 34.5–45)
HGB BLD-MCNC: 12.4 G/DL — SIGNIFICANT CHANGE UP (ref 11.5–15.5)
IMM GRANULOCYTES NFR BLD AUTO: 0.2 % — SIGNIFICANT CHANGE UP (ref 0–1.5)
LYMPHOCYTES # BLD AUTO: 2.29 K/UL — SIGNIFICANT CHANGE UP (ref 1–3.3)
LYMPHOCYTES # BLD AUTO: 35.5 % — SIGNIFICANT CHANGE UP (ref 13–44)
MCHC RBC-ENTMCNC: 27.7 PG — SIGNIFICANT CHANGE UP (ref 27–34)
MCHC RBC-ENTMCNC: 31 G/DL — LOW (ref 32–36)
MCV RBC AUTO: 89.3 FL — SIGNIFICANT CHANGE UP (ref 80–100)
MONOCYTES # BLD AUTO: 0.5 K/UL — SIGNIFICANT CHANGE UP (ref 0–0.9)
MONOCYTES NFR BLD AUTO: 7.8 % — SIGNIFICANT CHANGE UP (ref 2–14)
NEUTROPHILS # BLD AUTO: 3.47 K/UL — SIGNIFICANT CHANGE UP (ref 1.8–7.4)
NEUTROPHILS NFR BLD AUTO: 53.7 % — SIGNIFICANT CHANGE UP (ref 43–77)
NRBC # BLD: 0 /100 WBCS — SIGNIFICANT CHANGE UP (ref 0–0)
PLATELET # BLD AUTO: 338 K/UL — SIGNIFICANT CHANGE UP (ref 150–400)
RBC # BLD: 4.48 M/UL — SIGNIFICANT CHANGE UP (ref 3.8–5.2)
RBC # FLD: 14 % — SIGNIFICANT CHANGE UP (ref 10.3–14.5)
WBC # BLD: 6.45 K/UL — SIGNIFICANT CHANGE UP (ref 3.8–10.5)
WBC # FLD AUTO: 6.45 K/UL — SIGNIFICANT CHANGE UP (ref 3.8–10.5)

## 2021-10-25 PROCEDURE — 99205 OFFICE O/P NEW HI 60 MIN: CPT

## 2021-10-25 NOTE — RESULTS/DATA
[FreeTextEntry1] : 10/25/2021\par Review of available labs show normal range CBC.\par Coagulation panel: mildly elevated PTT since 2020, \par

## 2021-10-25 NOTE — HISTORY OF PRESENT ILLNESS
[de-identified] : 36 year-old Ms. NICOLE is seen in consult for elevated PTT, referred by her PCP. Patient mentions of easy bruising but has no h/o abnormal bleeding. She heavy periods all her life. Of note, she is a mother of three kids, one was delivered by . SHe did not have excessive bleeding in the procedure and did not require blood transfusion. She has no history of thrombosis. She has no family h/o bleeding disorder.

## 2021-10-25 NOTE — ASSESSMENT
[FreeTextEntry1] : 36 year-old Ms. NICOLE is seen in consult for elevated PTT. Patient has no h/o abnormal bleeding, no h/o thrombosis. BUt has h/o easy bruising. Will proceed with the following. \par \par # Elevated PTT\par - Mixing study \par - Factor assay \par - May need more labs\par - RTC in 2 months\par

## 2021-10-27 LAB
APTT 2H P 1:4 NP PPP: NORMAL
APTT 2H P INC PPP: NORMAL
APTT BLD: 34.7 SEC
APTT IMM NP/PRE NP PPP: NORMAL
APTT INV RATIO PPP: 34.7 SEC
FACT IX ACT/NOR PPP: 91 %
FACT VIII ACT/NOR PPP: 91 %
FACT XI ACT/NOR PPP: 118 %
INR PPP: 0.94 RATIO
NPP NORMAL POOLED PLASMA: NORMAL SECS
PT BLD: 11.2 SEC

## 2021-11-02 LAB
25(OH)D3 SERPL-MCNC: 23.1 NG/ML
ALBUMIN SERPL ELPH-MCNC: 4.6 G/DL
ALP BLD-CCNC: 63 U/L
ALT SERPL-CCNC: 11 U/L
ANION GAP SERPL CALC-SCNC: 15 MMOL/L
AST SERPL-CCNC: 18 U/L
BILIRUB SERPL-MCNC: 0.3 MG/DL
BUN SERPL-MCNC: 10 MG/DL
CALCIUM SERPL-MCNC: 9 MG/DL
CHLORIDE SERPL-SCNC: 101 MMOL/L
CHOLEST SERPL-MCNC: 242 MG/DL
CO2 SERPL-SCNC: 22 MMOL/L
CREAT SERPL-MCNC: 0.56 MG/DL
GLUCOSE SERPL-MCNC: 85 MG/DL
HDLC SERPL-MCNC: 56 MG/DL
LDLC SERPL CALC-MCNC: 171 MG/DL
LDLC SERPL DIRECT ASSAY-MCNC: 175 MG/DL
NONHDLC SERPL-MCNC: 186 MG/DL
POTASSIUM SERPL-SCNC: 4.4 MMOL/L
PROT SERPL-MCNC: 7.3 G/DL
SODIUM SERPL-SCNC: 138 MMOL/L
TRIGL SERPL-MCNC: 71 MG/DL

## 2021-11-03 LAB — FACTOR XIII ACTIVITY: 125 % ACTIV.

## 2021-11-05 NOTE — OB PROVIDER TRIAGE NOTE - LABOR: BISHOP SCORE
10 Otezla Counseling: The side effects of Otezla were discussed with the patient, including but not limited to worsening or new depression, weight loss, diarrhea, nausea, upper respiratory tract infection, and headache. Patient instructed to call the office should any adverse effect occur.  The patient verbalized understanding of the proper use and possible adverse effects of Otezla.  All the patient's questions and concerns were addressed.

## 2021-11-19 ENCOUNTER — APPOINTMENT (OUTPATIENT)
Dept: HEMATOLOGY ONCOLOGY | Facility: CLINIC | Age: 36
End: 2021-11-19

## 2021-12-21 ENCOUNTER — APPOINTMENT (OUTPATIENT)
Dept: INTERNAL MEDICINE | Facility: CLINIC | Age: 36
End: 2021-12-21

## 2021-12-25 NOTE — OB PROVIDER TRIAGE NOTE - NS_GBS_INFANT_INVASIVE_OBGYN_ALL_OB_FT
Normal rate, regular rhythm.  Heart sounds S1, S2.  No murmurs, rubs or gallops.
Patient states no history

## 2022-02-04 ENCOUNTER — APPOINTMENT (OUTPATIENT)
Dept: INTERNAL MEDICINE | Facility: CLINIC | Age: 37
End: 2022-02-04

## 2022-07-01 ENCOUNTER — EMERGENCY (EMERGENCY)
Facility: HOSPITAL | Age: 37
LOS: 1 days | Discharge: HOME | End: 2022-07-01
Attending: EMERGENCY MEDICINE | Admitting: EMERGENCY MEDICINE

## 2022-07-01 VITALS
TEMPERATURE: 98 F | SYSTOLIC BLOOD PRESSURE: 103 MMHG | OXYGEN SATURATION: 100 % | DIASTOLIC BLOOD PRESSURE: 69 MMHG | HEART RATE: 81 BPM | RESPIRATION RATE: 16 BRPM

## 2022-07-01 VITALS
OXYGEN SATURATION: 100 % | HEIGHT: 65 IN | DIASTOLIC BLOOD PRESSURE: 61 MMHG | TEMPERATURE: 98 F | HEART RATE: 109 BPM | SYSTOLIC BLOOD PRESSURE: 96 MMHG | RESPIRATION RATE: 18 BRPM

## 2022-07-01 DIAGNOSIS — Z98.891 HISTORY OF UTERINE SCAR FROM PREVIOUS SURGERY: Chronic | ICD-10-CM

## 2022-07-01 LAB
ALBUMIN SERPL ELPH-MCNC: 4.6 G/DL — SIGNIFICANT CHANGE UP (ref 3.3–5)
ALP SERPL-CCNC: 52 U/L — SIGNIFICANT CHANGE UP (ref 40–120)
ALT FLD-CCNC: 12 U/L — SIGNIFICANT CHANGE UP (ref 4–33)
ANION GAP SERPL CALC-SCNC: 12 MMOL/L — SIGNIFICANT CHANGE UP (ref 7–14)
APPEARANCE UR: CLEAR — SIGNIFICANT CHANGE UP
AST SERPL-CCNC: 15 U/L — SIGNIFICANT CHANGE UP (ref 4–32)
BASOPHILS # BLD AUTO: 0.01 K/UL — SIGNIFICANT CHANGE UP (ref 0–0.2)
BASOPHILS NFR BLD AUTO: 0.2 % — SIGNIFICANT CHANGE UP (ref 0–2)
BILIRUB SERPL-MCNC: 0.4 MG/DL — SIGNIFICANT CHANGE UP (ref 0.2–1.2)
BILIRUB UR-MCNC: NEGATIVE — SIGNIFICANT CHANGE UP
BUN SERPL-MCNC: 7 MG/DL — SIGNIFICANT CHANGE UP (ref 7–23)
CALCIUM SERPL-MCNC: 9.6 MG/DL — SIGNIFICANT CHANGE UP (ref 8.4–10.5)
CHLORIDE SERPL-SCNC: 105 MMOL/L — SIGNIFICANT CHANGE UP (ref 98–107)
CO2 SERPL-SCNC: 26 MMOL/L — SIGNIFICANT CHANGE UP (ref 22–31)
COLOR SPEC: COLORLESS — SIGNIFICANT CHANGE UP
CREAT SERPL-MCNC: 0.52 MG/DL — SIGNIFICANT CHANGE UP (ref 0.5–1.3)
DIFF PNL FLD: NEGATIVE — SIGNIFICANT CHANGE UP
EGFR: 123 ML/MIN/1.73M2 — SIGNIFICANT CHANGE UP
EOSINOPHIL # BLD AUTO: 0.08 K/UL — SIGNIFICANT CHANGE UP (ref 0–0.5)
EOSINOPHIL NFR BLD AUTO: 1.3 % — SIGNIFICANT CHANGE UP (ref 0–6)
GLUCOSE SERPL-MCNC: 88 MG/DL — SIGNIFICANT CHANGE UP (ref 70–99)
GLUCOSE UR QL: NEGATIVE — SIGNIFICANT CHANGE UP
HCT VFR BLD CALC: 43.7 % — SIGNIFICANT CHANGE UP (ref 34.5–45)
HGB BLD-MCNC: 13.6 G/DL — SIGNIFICANT CHANGE UP (ref 11.5–15.5)
IANC: 3.79 K/UL — SIGNIFICANT CHANGE UP (ref 1.8–7.4)
IMM GRANULOCYTES NFR BLD AUTO: 0.2 % — SIGNIFICANT CHANGE UP (ref 0–1.5)
KETONES UR-MCNC: NEGATIVE — SIGNIFICANT CHANGE UP
LEUKOCYTE ESTERASE UR-ACNC: NEGATIVE — SIGNIFICANT CHANGE UP
LYMPHOCYTES # BLD AUTO: 1.76 K/UL — SIGNIFICANT CHANGE UP (ref 1–3.3)
LYMPHOCYTES # BLD AUTO: 28.8 % — SIGNIFICANT CHANGE UP (ref 13–44)
MCHC RBC-ENTMCNC: 28.4 PG — SIGNIFICANT CHANGE UP (ref 27–34)
MCHC RBC-ENTMCNC: 31.1 GM/DL — LOW (ref 32–36)
MCV RBC AUTO: 91.2 FL — SIGNIFICANT CHANGE UP (ref 80–100)
MONOCYTES # BLD AUTO: 0.47 K/UL — SIGNIFICANT CHANGE UP (ref 0–0.9)
MONOCYTES NFR BLD AUTO: 7.7 % — SIGNIFICANT CHANGE UP (ref 2–14)
NEUTROPHILS # BLD AUTO: 3.79 K/UL — SIGNIFICANT CHANGE UP (ref 1.8–7.4)
NEUTROPHILS NFR BLD AUTO: 61.8 % — SIGNIFICANT CHANGE UP (ref 43–77)
NITRITE UR-MCNC: NEGATIVE — SIGNIFICANT CHANGE UP
NRBC # BLD: 0 /100 WBCS — SIGNIFICANT CHANGE UP
NRBC # FLD: 0 K/UL — SIGNIFICANT CHANGE UP
PH UR: 7.5 — SIGNIFICANT CHANGE UP (ref 5–8)
PLATELET # BLD AUTO: 247 K/UL — SIGNIFICANT CHANGE UP (ref 150–400)
POTASSIUM SERPL-MCNC: 4.2 MMOL/L — SIGNIFICANT CHANGE UP (ref 3.5–5.3)
POTASSIUM SERPL-SCNC: 4.2 MMOL/L — SIGNIFICANT CHANGE UP (ref 3.5–5.3)
PROT SERPL-MCNC: 8.3 G/DL — SIGNIFICANT CHANGE UP (ref 6–8.3)
PROT UR-MCNC: NEGATIVE — SIGNIFICANT CHANGE UP
RBC # BLD: 4.79 M/UL — SIGNIFICANT CHANGE UP (ref 3.8–5.2)
RBC # FLD: 12.6 % — SIGNIFICANT CHANGE UP (ref 10.3–14.5)
SODIUM SERPL-SCNC: 143 MMOL/L — SIGNIFICANT CHANGE UP (ref 135–145)
SP GR SPEC: 1 — SIGNIFICANT CHANGE UP (ref 1–1.05)
UROBILINOGEN FLD QL: SIGNIFICANT CHANGE UP
WBC # BLD: 6.12 K/UL — SIGNIFICANT CHANGE UP (ref 3.8–10.5)
WBC # FLD AUTO: 6.12 K/UL — SIGNIFICANT CHANGE UP (ref 3.8–10.5)

## 2022-07-01 PROCEDURE — 76817 TRANSVAGINAL US OBSTETRIC: CPT | Mod: 26

## 2022-07-01 PROCEDURE — 99285 EMERGENCY DEPT VISIT HI MDM: CPT

## 2022-07-01 PROCEDURE — 76830 TRANSVAGINAL US NON-OB: CPT | Mod: 26

## 2022-07-01 RX ORDER — ACETAMINOPHEN 500 MG
975 TABLET ORAL ONCE
Refills: 0 | Status: COMPLETED | OUTPATIENT
Start: 2022-07-01 | End: 2022-07-01

## 2022-07-01 RX ORDER — AZITHROMYCIN 500 MG/1
1000 TABLET, FILM COATED ORAL ONCE
Refills: 0 | Status: COMPLETED | OUTPATIENT
Start: 2022-07-01 | End: 2022-07-01

## 2022-07-01 RX ORDER — CEFTRIAXONE 500 MG/1
500 INJECTION, POWDER, FOR SOLUTION INTRAMUSCULAR; INTRAVENOUS ONCE
Refills: 0 | Status: COMPLETED | OUTPATIENT
Start: 2022-07-01 | End: 2022-07-01

## 2022-07-01 RX ORDER — SODIUM CHLORIDE 9 MG/ML
1000 INJECTION INTRAMUSCULAR; INTRAVENOUS; SUBCUTANEOUS ONCE
Refills: 0 | Status: COMPLETED | OUTPATIENT
Start: 2022-07-01 | End: 2022-07-01

## 2022-07-01 RX ADMIN — SODIUM CHLORIDE 1000 MILLILITER(S): 9 INJECTION INTRAMUSCULAR; INTRAVENOUS; SUBCUTANEOUS at 15:48

## 2022-07-01 RX ADMIN — AZITHROMYCIN 1000 MILLIGRAM(S): 500 TABLET, FILM COATED ORAL at 16:06

## 2022-07-01 RX ADMIN — CEFTRIAXONE 500 MILLIGRAM(S): 500 INJECTION, POWDER, FOR SOLUTION INTRAMUSCULAR; INTRAVENOUS at 16:09

## 2022-07-01 NOTE — ED PROVIDER NOTE - PROGRESS NOTE DETAILS
Patient medicated prophylactically for STI. Informed of ultrasound results. Agrees to follow up with OB-GYN outpatient for ultrasound in 8-12 weeks for resolution of cyst. Reports improvement in pain.     Magnolia Avelar MD, PGY1

## 2022-07-01 NOTE — ED PROVIDER NOTE - PATIENT PORTAL LINK FT
You can access the FollowMyHealth Patient Portal offered by [facility] by registering at the following website: [email or web address]. By joining [employer]’s FollowMyHealth portal, you will also be able to view your health information using other applications (apps) compatible with our system.

## 2022-07-01 NOTE — ED PROVIDER NOTE - CLINICAL SUMMARY MEDICAL DECISION MAKING FREE TEXT BOX
37 yo F no pmh presents with vaginal bleeding with defecation. Patient slightly hypotensive, tachycardic. Blood with mucus on speculum exam. Will obtain TVUS, swab for GC/chlamydia, prophylactically treat stis, basic labs. Concern for STI vs PID. IVF, pain control, reassess.

## 2022-07-01 NOTE — ED ADULT NURSE NOTE - DRUG PRE-SCREENING (DAST -1)
Spoke with the patient he reports that he leased a machine in 2017 when he had to have his machine repaired  The leased machine was returned to the DME  Right now the patient has a machine that functions  He wants to wait until COVID 19 is over    Dr Tresa Townsend please reorder the split study and we will schedule him for June or July Statement Selected

## 2022-07-01 NOTE — ED PROVIDER NOTE - ATTENDING CONTRIBUTION TO CARE
36y F w/ no PMHx presenting w/ vaginal bleeding with her BM x2 days. States she has been having increased BM, sometimes loose, and every time she has a BM has vaginal bleeding mixed w/ mucus. Pt also reports associated burning suprapubic pain, back pain, and nausea, but denies vomiting, dysuria, or f/c. States this has never happened before. States she is not on her menses, LMP 6/10.

## 2022-07-01 NOTE — ED PROVIDER NOTE - PHYSICAL EXAMINATION
GENERAL: no acute distress, non-toxic appearing  HEAD: normocephalic, atraumatic  HEENT: normal conjunctiva, oral mucosa moist, neck supple  CARDIAC: regular rate and rhythm, normal S1 and S2,  no appreciable murmurs  PULM: clear to ascultation bilaterally, no crackles, rales, rhonchi, or wheezing  GI: suprapubic abdominal tenderness. abdomen nondistended, soft, no guarding or rebound tenderness  : no CVA tenderness, no suprapubic tenderness  NEURO: alert and oriented x 3, normal speech, PERRLA, EOMI, no focal motor or sensory deficits, nonantalgic gait  MSK: no visible deformities, no peripheral edema, calf tenderness/redness/swelling  SKIN: no visible rashes, dry, well-perfused  PSYCH: appropriate mood and affect GENERAL: no acute distress, non-toxic appearing  HEAD: normocephalic, atraumatic  HEENT: normal conjunctiva, oral mucosa moist, neck supple  CARDIAC: regular rate and rhythm, normal S1 and S2,  no appreciable murmurs  PULM: clear to ascultation bilaterally, no crackles, rales, rhonchi, or wheezing  GI: abdomen nondistended, soft, no guarding or rebound tenderness  : positive suprapubic tenderness, no CVA tenderness  NEURO: alert and oriented x 3, normal speech, PERRLA, EOMI, no focal motor or sensory deficits, nonantalgic gait  MSK: no visible deformities, no peripheral edema, calf tenderness/redness/swelling  SKIN: no visible rashes, dry, well-perfused  PSYCH: appropriate mood and affect GENERAL: no acute distress, non-toxic appearing  HEAD: normocephalic, atraumatic  HEENT: normal conjunctiva, oral mucosa moist, neck supple  CARDIAC: regular rate and rhythm, normal S1 and S2,  no appreciable murmurs  PULM: clear to ascultation bilaterally, no crackles, rales, rhonchi, or wheezing  GI: abdomen nondistended, soft, no guarding or rebound tenderness  : positive suprapubic tenderness, no CVA tenderness  - pelvic exam: blood mixed with mucus oozing from os, no CMT, no adnexal tenderness   NEURO: alert and oriented x 3, normal speech, moving all extremities  MSK: no visible deformities, no peripheral edema, calf tenderness/redness/swelling  SKIN: no visible rashes, dry, well-perfused  PSYCH: appropriate mood and affect

## 2022-07-01 NOTE — ED ADULT NURSE NOTE - OBJECTIVE STATEMENT
Patient is a 37 yo female, h/x GERD, presenting with vaginal bleeding x 2 days. Patient reports her LMP was 6/10. Started bleeding 2 days ago with mucous vaginal discharge and mild abdominal cramping/back pain. AAOx4, no signs of distress, denies fever, chills, urinary symptoms, nausea, vomiting. 20G PIV placed to LAC, labs sent per orders. Pending ultrasound. Fall precautions maintained.

## 2022-07-01 NOTE — ED ADULT TRIAGE NOTE - CHIEF COMPLAINT QUOTE
states "I am  having diarrhea since 2 days and now I am having  blood tinged vaginal discharge" LMP June 10th

## 2022-07-01 NOTE — ED PROVIDER NOTE - NSFOLLOWUPINSTRUCTIONS_ED_ALL_ED_FT
You were seen for vaginal bleeding. You were seen to have mucus and bleeding on your pelvic exam and were treated prophylactically for gonorrhea and chlamydia. On your ultrasound, there was a hemorrhagic cyst identified that needs to be followed in 8-12 weeks with another ultrasound. Take Tylenol 650 mg or Motrin 600 mg every 6 hours for pain.     Return to the ER if you have increasing pain, fevers, chills, abnormal discharge, nausea, or vomiting along with this pain.

## 2022-07-02 LAB
C TRACH RRNA SPEC QL NAA+PROBE: SIGNIFICANT CHANGE UP
CULTURE RESULTS: SIGNIFICANT CHANGE UP
N GONORRHOEA RRNA SPEC QL NAA+PROBE: SIGNIFICANT CHANGE UP
SPECIMEN SOURCE: SIGNIFICANT CHANGE UP
SPECIMEN SOURCE: SIGNIFICANT CHANGE UP

## 2022-07-21 NOTE — ED ADULT NURSE NOTE - PT NEEDS ASSIST
no
on the discharge service for the patient. I have reviewed and made amendments to the documentation where necessary.

## 2022-09-27 ENCOUNTER — APPOINTMENT (OUTPATIENT)
Dept: INTERNAL MEDICINE | Facility: CLINIC | Age: 37
End: 2022-09-27

## 2022-09-27 VITALS — SYSTOLIC BLOOD PRESSURE: 90 MMHG | DIASTOLIC BLOOD PRESSURE: 60 MMHG | HEART RATE: 105 BPM | OXYGEN SATURATION: 99 %

## 2022-09-27 VITALS
HEART RATE: 117 BPM | OXYGEN SATURATION: 98 % | BODY MASS INDEX: 19.97 KG/M2 | DIASTOLIC BLOOD PRESSURE: 60 MMHG | TEMPERATURE: 99 F | SYSTOLIC BLOOD PRESSURE: 90 MMHG | HEIGHT: 64 IN | WEIGHT: 117 LBS

## 2022-09-27 VITALS — SYSTOLIC BLOOD PRESSURE: 85 MMHG | HEART RATE: 83 BPM | DIASTOLIC BLOOD PRESSURE: 60 MMHG | OXYGEN SATURATION: 99 %

## 2022-09-27 VITALS — DIASTOLIC BLOOD PRESSURE: 60 MMHG | HEART RATE: 92 BPM | OXYGEN SATURATION: 99 % | SYSTOLIC BLOOD PRESSURE: 90 MMHG

## 2022-09-27 DIAGNOSIS — E78.5 HYPERLIPIDEMIA, UNSPECIFIED: ICD-10-CM

## 2022-09-27 DIAGNOSIS — M25.50 PAIN IN UNSPECIFIED JOINT: ICD-10-CM

## 2022-09-27 DIAGNOSIS — R42 DIZZINESS AND GIDDINESS: ICD-10-CM

## 2022-09-27 PROCEDURE — 99214 OFFICE O/P EST MOD 30 MIN: CPT | Mod: 25

## 2022-09-27 PROCEDURE — 85018 HEMOGLOBIN: CPT | Mod: QW

## 2022-09-27 PROCEDURE — 36415 COLL VENOUS BLD VENIPUNCTURE: CPT

## 2022-09-27 NOTE — PHYSICAL EXAM
[No Joint Swelling] : no joint swelling [de-identified] : good ROM of shoulders, elbows, wrist, fingers, knees, ankles - no pain

## 2022-09-27 NOTE — HISTORY OF PRESENT ILLNESS
[FreeTextEntry1] : dizziness [de-identified] : dizziness- 3 yrs. since givingl birth to her son- lightheaded- when she stands- not every time. lightheaded duration a few min\par elev PTT- rec f/u w Heme 10/22/21-reviewed 10/25/21 nl PTT, 11/1/21vit D 23, \par elev chol- has improved her diet\par 2 yrs-knee pain, wrist, L lat malleous, chin- more on L side than R side, occasionally posterior part of head- duration a few min- days. worse in the AM. no sign wgt loss or fever. sympt are not daily- 3-5 days/ mo.  sees homeopathic doctor and was given med for possible autoimmune problem\par elev amylase- in [place]- pt showed me the results on her phone- 09/2022\par heavy period- all her life.  has hx of fibroids. \par 3/11/19 EGD-nl

## 2022-09-28 ENCOUNTER — APPOINTMENT (OUTPATIENT)
Dept: INTERNAL MEDICINE | Facility: CLINIC | Age: 37
End: 2022-09-28

## 2022-09-29 ENCOUNTER — NON-APPOINTMENT (OUTPATIENT)
Age: 37
End: 2022-09-29

## 2022-09-29 LAB
25(OH)D3 SERPL-MCNC: 129 NG/ML
ALBUMIN SERPL ELPH-MCNC: 4.9 G/DL
ALP BLD-CCNC: 51 U/L
ALT SERPL-CCNC: 14 U/L
AMYLASE/CREAT SERPL: 129 U/L
ANA SER IF-ACNC: NEGATIVE
ANION GAP SERPL CALC-SCNC: 15 MMOL/L
AST SERPL-CCNC: 18 U/L
BASOPHILS # BLD AUTO: 0.03 K/UL
BASOPHILS NFR BLD AUTO: 0.6 %
BILIRUB SERPL-MCNC: 0.4 MG/DL
BUN SERPL-MCNC: 8 MG/DL
CALCIUM SERPL-MCNC: 9.9 MG/DL
CCP AB SER IA-ACNC: <8 UNITS
CHLORIDE SERPL-SCNC: 103 MMOL/L
CHOLEST SERPL-MCNC: 226 MG/DL
CO2 SERPL-SCNC: 25 MMOL/L
CREAT SERPL-MCNC: 0.63 MG/DL
CRP SERPL-MCNC: <3 MG/L
EGFR: 117 ML/MIN/1.73M2
EOSINOPHIL # BLD AUTO: 0.15 K/UL
EOSINOPHIL NFR BLD AUTO: 3.1 %
ERYTHROCYTE [SEDIMENTATION RATE] IN BLOOD BY WESTERGREN METHOD: 42 MM/HR
GLUCOSE SERPL-MCNC: 95 MG/DL
HCT VFR BLD CALC: 40.9 %
HCV AB SER QL: NONREACTIVE
HCV S/CO RATIO: 0.2 S/CO
HDLC SERPL-MCNC: 50 MG/DL
HGB BLD-MCNC: 13.1 G/DL
IMM GRANULOCYTES NFR BLD AUTO: 0 %
LDLC SERPL CALC-MCNC: 141 MG/DL
LDLC SERPL DIRECT ASSAY-MCNC: 148 MG/DL
LYMPHOCYTES # BLD AUTO: 1.87 K/UL
LYMPHOCYTES NFR BLD AUTO: 39 %
MAN DIFF?: NORMAL
MCHC RBC-ENTMCNC: 29.5 PG
MCHC RBC-ENTMCNC: 32 GM/DL
MCV RBC AUTO: 92.1 FL
MONOCYTES # BLD AUTO: 0.33 K/UL
MONOCYTES NFR BLD AUTO: 6.9 %
NEUTROPHILS # BLD AUTO: 2.42 K/UL
NEUTROPHILS NFR BLD AUTO: 50.4 %
NONHDLC SERPL-MCNC: 176 MG/DL
PLATELET # BLD AUTO: 278 K/UL
POTASSIUM SERPL-SCNC: 4.7 MMOL/L
PROT SERPL-MCNC: 7.5 G/DL
RBC # BLD: 4.44 M/UL
RBC # FLD: 12.9 %
RF+CCP IGG SER-IMP: NEGATIVE
RHEUMATOID FACT SER QL: <10 IU/ML
SODIUM SERPL-SCNC: 142 MMOL/L
T4 FREE SERPL-MCNC: 1 NG/DL
TRIGL SERPL-MCNC: 172 MG/DL
TSH SERPL-ACNC: 4.21 UIU/ML
WBC # FLD AUTO: 4.8 K/UL

## 2022-09-30 LAB — LPL SERPL-CCNC: 39 U/L

## 2022-10-12 ENCOUNTER — APPOINTMENT (OUTPATIENT)
Dept: INTERNAL MEDICINE | Facility: CLINIC | Age: 37
End: 2022-10-12

## 2022-10-27 ENCOUNTER — APPOINTMENT (OUTPATIENT)
Dept: RHEUMATOLOGY | Facility: CLINIC | Age: 37
End: 2022-10-27

## 2022-11-17 NOTE — OB PROVIDER H&P - NS_PHYSICALALLNEG_OBGYN_ALL_OB
Pt is scheduled to see Kisha on 12/16, I offered her a follow up with you tomorrow however her mom just got out of the hospital and she is staying with her in [place].    Her symptoms include no diarrhea.  She is experiencing a lot of mucus and blood.  She stated the blood is bright red and she is seeing it on the tissue, sometimes in the toilet water.    You had a few new pt apts, if you are ok that I use one of those I may be able to get her in sooner.    Please let me know how you would like to proceed   All other review of systems negative, except as noted in HPI

## 2023-02-15 NOTE — OB PROVIDER TRIAGE NOTE - NS_SONONOTE_OBGYN_ALL_OB_FT
Libertad Hatfield just seen patient on 2/9/23 do you want her to have an appointment   presentation only

## 2023-03-08 ENCOUNTER — APPOINTMENT (OUTPATIENT)
Dept: INTERNAL MEDICINE | Facility: CLINIC | Age: 38
End: 2023-03-08

## 2023-03-13 LAB
25(OH)D3 SERPL-MCNC: 36.7 NG/ML
ALBUMIN SERPL ELPH-MCNC: 4.4 G/DL
ALP BLD-CCNC: 51 U/L
ALT SERPL-CCNC: 16 U/L
ANION GAP SERPL CALC-SCNC: 9 MMOL/L
AST SERPL-CCNC: 20 U/L
BASOPHILS # BLD AUTO: 0.03 K/UL
BASOPHILS NFR BLD AUTO: 0.6 %
BILIRUB SERPL-MCNC: 0.4 MG/DL
BUN SERPL-MCNC: 11 MG/DL
CALCIUM SERPL-MCNC: 9.5 MG/DL
CHLORIDE SERPL-SCNC: 105 MMOL/L
CHOLEST SERPL-MCNC: 195 MG/DL
CO2 SERPL-SCNC: 24 MMOL/L
CREAT SERPL-MCNC: 0.61 MG/DL
EGFR: 118 ML/MIN/1.73M2
EOSINOPHIL # BLD AUTO: 0.12 K/UL
EOSINOPHIL NFR BLD AUTO: 2.3 %
ERYTHROCYTE [SEDIMENTATION RATE] IN BLOOD BY WESTERGREN METHOD: 26 MM/HR
GLUCOSE SERPL-MCNC: 93 MG/DL
HCT VFR BLD CALC: 39.1 %
HDLC SERPL-MCNC: 54 MG/DL
HGB BLD-MCNC: 12.5 G/DL
IMM GRANULOCYTES NFR BLD AUTO: 0.2 %
LDLC SERPL CALC-MCNC: 130 MG/DL
LYMPHOCYTES # BLD AUTO: 2.08 K/UL
LYMPHOCYTES NFR BLD AUTO: 39.5 %
MAN DIFF?: NORMAL
MCHC RBC-ENTMCNC: 29.2 PG
MCHC RBC-ENTMCNC: 32 GM/DL
MCV RBC AUTO: 91.4 FL
MONOCYTES # BLD AUTO: 0.47 K/UL
MONOCYTES NFR BLD AUTO: 8.9 %
NEUTROPHILS # BLD AUTO: 2.55 K/UL
NEUTROPHILS NFR BLD AUTO: 48.5 %
NONHDLC SERPL-MCNC: 142 MG/DL
PLATELET # BLD AUTO: 313 K/UL
POTASSIUM SERPL-SCNC: 4.6 MMOL/L
PROT SERPL-MCNC: 7 G/DL
RBC # BLD: 4.28 M/UL
RBC # FLD: 13.3 %
SODIUM SERPL-SCNC: 138 MMOL/L
T4 FREE SERPL-MCNC: 1 NG/DL
TRIGL SERPL-MCNC: 58 MG/DL
TSH SERPL-ACNC: 3.73 UIU/ML
WBC # FLD AUTO: 5.26 K/UL

## 2023-03-15 ENCOUNTER — APPOINTMENT (OUTPATIENT)
Dept: INTERNAL MEDICINE | Facility: CLINIC | Age: 38
End: 2023-03-15
Payer: COMMERCIAL

## 2023-03-15 ENCOUNTER — NON-APPOINTMENT (OUTPATIENT)
Age: 38
End: 2023-03-15

## 2023-03-15 VITALS — OXYGEN SATURATION: 98 % | DIASTOLIC BLOOD PRESSURE: 66 MMHG | SYSTOLIC BLOOD PRESSURE: 100 MMHG | HEART RATE: 65 BPM

## 2023-03-15 VITALS
OXYGEN SATURATION: 98 % | WEIGHT: 119 LBS | DIASTOLIC BLOOD PRESSURE: 60 MMHG | BODY MASS INDEX: 20.32 KG/M2 | HEART RATE: 91 BPM | SYSTOLIC BLOOD PRESSURE: 90 MMHG | HEIGHT: 64 IN

## 2023-03-15 VITALS — DIASTOLIC BLOOD PRESSURE: 60 MMHG | HEART RATE: 90 BPM | SYSTOLIC BLOOD PRESSURE: 90 MMHG

## 2023-03-15 DIAGNOSIS — H02.9 UNSPECIFIED DISORDER OF EYELID: ICD-10-CM

## 2023-03-15 DIAGNOSIS — Z00.00 ENCOUNTER FOR GENERAL ADULT MEDICAL EXAMINATION W/OUT ABNORMAL FINDINGS: ICD-10-CM

## 2023-03-15 DIAGNOSIS — Z87.39 PERSONAL HISTORY OF OTHER DISEASES OF THE MUSCULOSKELETAL SYSTEM AND CONNECTIVE TISSUE: ICD-10-CM

## 2023-03-15 DIAGNOSIS — E03.8 OTHER SPECIFIED HYPOTHYROIDISM: ICD-10-CM

## 2023-03-15 DIAGNOSIS — R74.8 ABNORMAL LEVELS OF OTHER SERUM ENZYMES: ICD-10-CM

## 2023-03-15 DIAGNOSIS — L50.9 URTICARIA, UNSPECIFIED: ICD-10-CM

## 2023-03-15 DIAGNOSIS — L65.9 NONSCARRING HAIR LOSS, UNSPECIFIED: ICD-10-CM

## 2023-03-15 PROCEDURE — 93000 ELECTROCARDIOGRAM COMPLETE: CPT

## 2023-03-15 PROCEDURE — 99395 PREV VISIT EST AGE 18-39: CPT | Mod: 25

## 2023-03-15 RX ORDER — PNV NO.95/FERROUS FUM/FOLIC AC 28MG-0.8MG
TABLET ORAL
Refills: 0 | Status: ACTIVE | COMMUNITY

## 2023-03-15 NOTE — HISTORY OF PRESENT ILLNESS
[FreeTextEntry1] : CPE [de-identified] : vaccine- refused COVID vac. got COVID last yr.\par diet- vegetarian\par exercise- 3/ wk-treadmill, bike, wgt- no CP or SOB\par dizziness- 3 yrs. since givingl birth to her son- lightheaded- when she stands- not every time. lightheaded duration a few min\par  chol- has improved her diet\par random hives- last episode of hive- last wk\par sees Ophth- for elev pressures\par c/o pain - tailbone- if sitting for 1 hr. states area is tender on palpation- no fall \par 2 yrs-knee pain, wrist, L lat malleous, chin- moves to different jt - lasting a few min\par elev amylase- in [place]-\par heavy period- all her life.  has hx of fibroids. \par 3/11/19 EGD-nl\par hair loss- frontal - 1 yr .  started after COVID.  no incr stressor

## 2023-03-15 NOTE — PLAN
[FreeTextEntry1] : EKG- NSR 77\par dizziness on standing- incr HR on standing-?POTs\par Hair loss- ? due to COVID

## 2023-03-15 NOTE — PHYSICAL EXAM
[No Acute Distress] : no acute distress [Well Nourished] : well nourished [Well Developed] : well developed [Well-Appearing] : well-appearing [Normal Sclera/Conjunctiva] : normal sclera/conjunctiva [PERRL] : pupils equal round and reactive to light [Normal Outer Ear/Nose] : the outer ears and nose were normal in appearance [Normal Oropharynx] : the oropharynx was normal [Normal TMs] : both tympanic membranes were normal [No Lymphadenopathy] : no lymphadenopathy [Supple] : supple [Thyroid Normal, No Nodules] : the thyroid was normal and there were no nodules present [No Respiratory Distress] : no respiratory distress  [No Accessory Muscle Use] : no accessory muscle use [Clear to Auscultation] : lungs were clear to auscultation bilaterally [Normal Rate] : normal rate  [Regular Rhythm] : with a regular rhythm [Normal S1, S2] : normal S1 and S2 [No Murmur] : no murmur heard [No Edema] : there was no peripheral edema [Normal Appearance] : normal in appearance [No Axillary Lymphadenopathy] : no axillary lymphadenopathy [Soft] : abdomen soft [Non-distended] : non-distended [Non Tender] : non-tender [No Masses] : no abdominal mass palpated [Normal Bowel Sounds] : normal bowel sounds [Grossly Normal Strength/Tone] : grossly normal strength/tone [No Focal Deficits] : no focal deficits [Normal Gait] : normal gait [Normal Affect] : the affect was normal [Normal Insight/Judgement] : insight and judgment were intact

## 2023-03-15 NOTE — HEALTH RISK ASSESSMENT
[Never (0 pts)] : Never (0 points) [No] : In the past 12 months have you used drugs other than those required for medical reasons? No [0] : 2) Feeling down, depressed, or hopeless: Not at all (0) [PHQ-2 Negative - No further assessment needed] : PHQ-2 Negative - No further assessment needed [Patient reported PAP Smear was normal] : Patient reported PAP Smear was normal [Patient reported colonoscopy was normal] : Patient reported colonoscopy was normal [HIV test declined] : HIV test declined [Hepatitis C test declined] : Hepatitis C test declined [Designated Healthcare Proxy] : Designated healthcare proxy [Relationship: ___] : Relationship: [unfilled] [Never] : Never [Audit-CScore] : 0 [[id number]] : 0 [PapSmearDate] : 03/22 [PapSmearComments] : as per pt.  seen new GYN [ColonoscopyDate] : 03/19 [ColonoscopyComments] : hemorrhoid [de-identified] : wear glasses- seen ophth 1 yr ago- pt will make f/u appt [de-identified] : last seen dentist a few mo ago [AdvancecareDate] : 03/23

## 2023-03-16 LAB
AMYLASE/CREAT SERPL: 120 U/L
LPL SERPL-CCNC: 45 U/L

## 2023-08-04 ENCOUNTER — LABORATORY RESULT (OUTPATIENT)
Age: 38
End: 2023-08-04

## 2023-08-04 ENCOUNTER — APPOINTMENT (OUTPATIENT)
Dept: INTERNAL MEDICINE | Facility: CLINIC | Age: 38
End: 2023-08-04
Payer: COMMERCIAL

## 2023-08-04 VITALS
BODY MASS INDEX: 20.32 KG/M2 | DIASTOLIC BLOOD PRESSURE: 70 MMHG | SYSTOLIC BLOOD PRESSURE: 114 MMHG | OXYGEN SATURATION: 99 % | HEIGHT: 64 IN | WEIGHT: 119 LBS

## 2023-08-04 DIAGNOSIS — R68.89 OTHER GENERAL SYMPTOMS AND SIGNS: ICD-10-CM

## 2023-08-04 DIAGNOSIS — F41.9 ANXIETY DISORDER, UNSPECIFIED: ICD-10-CM

## 2023-08-04 DIAGNOSIS — R20.2 PARESTHESIA OF SKIN: ICD-10-CM

## 2023-08-04 DIAGNOSIS — R63.1 POLYDIPSIA: ICD-10-CM

## 2023-08-04 DIAGNOSIS — R39.9 UNSPECIFIED SYMPTOMS AND SIGNS INVOLVING THE GENITOURINARY SYSTEM: ICD-10-CM

## 2023-08-04 PROCEDURE — 81003 URINALYSIS AUTO W/O SCOPE: CPT | Mod: QW

## 2023-08-04 PROCEDURE — 36415 COLL VENOUS BLD VENIPUNCTURE: CPT

## 2023-08-04 PROCEDURE — 93000 ELECTROCARDIOGRAM COMPLETE: CPT

## 2023-08-04 PROCEDURE — 99214 OFFICE O/P EST MOD 30 MIN: CPT | Mod: 25

## 2023-08-04 NOTE — HISTORY OF PRESENT ILLNESS
[FreeTextEntry8] : Pt reports a lot of somatic compliments frequency of urination at night  chest pain  tingling sensation w toes hot flushes increased heart rate exercises every day BM is normal Has MP started today.

## 2023-08-16 LAB
ALBUMIN SERPL ELPH-MCNC: 4.7 G/DL
ALP BLD-CCNC: 53 U/L
ALT SERPL-CCNC: 11 U/L
ANION GAP SERPL CALC-SCNC: 13 MMOL/L
AST SERPL-CCNC: 21 U/L
BILIRUB SERPL-MCNC: 0.3 MG/DL
BUN SERPL-MCNC: 10 MG/DL
CALCIUM SERPL-MCNC: 9.6 MG/DL
CHLORIDE SERPL-SCNC: 103 MMOL/L
CO2 SERPL-SCNC: 22 MMOL/L
CREAT SERPL-MCNC: 0.5 MG/DL
CRP SERPL-MCNC: <3 MG/L
EGFR: 124 ML/MIN/1.73M2
ERYTHROCYTE [SEDIMENTATION RATE] IN BLOOD BY WESTERGREN METHOD: 48 MM/HR
ESTIMATED AVERAGE GLUCOSE: 111 MG/DL
GLUCOSE SERPL-MCNC: 112 MG/DL
HBA1C MFR BLD HPLC: 5.5 %
POTASSIUM SERPL-SCNC: 4.7 MMOL/L
PROT SERPL-MCNC: 8.1 G/DL
SODIUM SERPL-SCNC: 138 MMOL/L
VIT B12 SERPL-MCNC: 642 PG/ML
VIT B12 USB SERPL-MCNC: 584 PG/ML

## 2024-01-30 ENCOUNTER — APPOINTMENT (OUTPATIENT)
Dept: DERMATOLOGY | Facility: CLINIC | Age: 39
End: 2024-01-30

## 2024-02-14 NOTE — PHYSICAL EXAM
"2/13/2024  Name: Tara Tierney  YOB: 1953    Chief complaint: Osteomyelitis of lumbar spine.    HPI: This is a 70 year old  female who has a medical history remarkable for CAD, PVD, AAA, cardiomyopathy, hyperlipidemia, carotid artery stenosis, COPD, lumbago with sciatica, and DVT of R leg. Patient has Laminectomy with fusion of L 3-L 4 with Dr. Coombs on 11/20/2023. Patient had post operative infection involving Staphylococcus aureus (MRSA )and required wound care and course of Vancomycin.  Patient had I/D on 12/6/23 and repeat washout on 12/11/23. She had irrigation and debridement of lumbar spine with removal of hardware on 12/31/2023. IVC filter placed on 1/2/2024. Antibiotics were changed to Daptomycin and Ceftaroline with an end date of 2/28/2024. She was seen again in the ER on 1/27/2024 for acute toxic metabolic encephalopathy most likely secondary to meningitis. Her altered mental status has resolved, with treatment for meningitis with rifampicin, ceftriaxone, Cubicin was continued. Lumbar incision sutures were removed and wound VAC was place. Dr. Reyes was consulted to monitor wound. Patient was discharged to SNF for rehab, wound care and antibiotic treatment.    Wound Check  She was originally treated more than 14 days ago. Previous treatment included IV/IM antibiotics and wound cleansing or irrigation. There has been colored discharge from the wound. There is no redness present. There is no swelling present. The pain has improved. There is difficulty moving the extremity or digit due to weakness.     Review of systems:   ROS negative except were noted in HPI.    Code Status: full code    /66   Pulse 86   Temp 36.6 °C (97.9 °F)   Resp 18   Ht 1.448 m (4' 9\")   Wt 66.7 kg (147 lb)   SpO2 97%   BMI 31.81 kg/m²      Physical Exam  Constitutional:       General: She is not in acute distress.  HENT:      Head: Normocephalic.      Nose: Nose normal. No congestion.    "   Mouth/Throat:      Mouth: Mucous membranes are moist.   Eyes:      Extraocular Movements: Extraocular movements intact.      Pupils: Pupils are equal, round, and reactive to light.   Cardiovascular:      Rate and Rhythm: Normal rate and regular rhythm.      Pulses: Normal pulses.   Pulmonary:      Effort: Pulmonary effort is normal.      Breath sounds: Normal breath sounds. No wheezing or rales.   Abdominal:      General: Bowel sounds are normal. There is no distension.      Palpations: Abdomen is soft.      Tenderness: There is no abdominal tenderness. There is no guarding.   Genitourinary:     Comments: Incontinent of urine    Musculoskeletal:         General: Normal range of motion.      Cervical back: Normal range of motion.   Lymphadenopathy:      Cervical: No cervical adenopathy.   Skin:     General: Skin is warm.      Capillary Refill: Capillary refill takes less than 2 seconds.      Comments: Lumbar wound VAC intact.   Neurological:      General: No focal deficit present.      Mental Status: She is alert and oriented to person, place, and time.   Psychiatric:         Mood and Affect: Mood normal.         Behavior: Behavior normal.        Medications reviewed during visit at facility.  Tylenol 650 mg po q 4 hours prn  MOM 30 ml po daily prn  Ezetimibe 10 mg po daily  Ceftriaxone 2 grams IV bid  Vitamin D 3 2000 UT po daily  Meclizine 25 mg po q 8 hours prn  Eliquis 5 mg po bid  Atrovent 17 mcg two puffs bid  Ibandronate 150 mg po daily  Aspirin 81 mg po daily  Claritin 10 mg po daily  Potassium Chloride 10 meq po daily  Brimonidine 0.2% one drop both eyes bid  Pro Air 108 two puffs q 6 hours prn  Lasix 20 mg po daily  Oxycodone/APAP 5/325 mg  one tablet po q 6 hours prn  HYDROmorphone HCl Oral Tablet 2 MG po q 4 hours prn  cleanse spinal incision with NS apply wound vac at 125mmHg suction with black foam to wound bed, change 3x weekly and PRN if soiled or removed  Rifampin 300 mg po bid  Salagen 5 mg po  [No Acute Distress] : no acute distress [Well Nourished] : well nourished daily  Daptomycin 420 mg IV daily  PreserVison AREDS one capsule po bid  Pregabalin 75 mg po daily  Tolterodine 1 mg po daily  Lutein 20 mg po daily  Cyclobenzaprine 5 mg po q 8 hours prn  Isosorbide ER 60 mg po daily  Pantoprazole 40 mg po daily  Remeron 15 mg po daily  Atenolol 50 mg po daily    Labs reviewed at facility:     Laboratory Service Report [id number]   Patient Name   KILEY LOJA  Patient ID   [id number]  Age   70 Y  Gender   F  Order #      Ordering NOMAN Vyas  Patient Telephone #   N/A     1953  AKA      Client Order #   [id number]   Collection Date and Time   2024 05:39   Print Date and Time   2024 21:40  Account Information   [facility]   [address] [place] [address]     Report Notes                        Test Results   Reference Perform  Unit  Value Site*             CBC and Differential  REPORTED 2024 09:20  White Blood Cell Count   5.95 k/uL 3.70-11.00    RBC L 3.73 m/uL 3.90-5.20    Hemoglobin L 10.8 g/dL 11.5-15.5    Hematocrit L 34.8 % 36.0-46.0    MCV   93.3 fL 80.0-100.0    MCH   29.0 pg 26.0-34.0    MCHC   31.0 g/dL 30.5-36.0    RDW-CV H 16.1 % 11.5-15.0    Platelet Count   318 k/uL 150-400    MPV   9.1 fL 9.0-12.7    Neut%   69.5 %    Abs Neut   4.14 k/uL 1.45-7.50    Lymph%   20.2 %    Abs Lymph   1.20 k/uL 1.00-4.00    Mono%   7.4 %    Abs Mono   0.44 k/uL <0.87    Eosin%   2.0 %    Abs Eosin   0.12 k/uL <0.46    Baso%   0.2 %    Abs Baso   <0.03 k/uL <0.11    Immature Gran %%   0.7 %    Abs Immature Gran   0.04 k/uL <0.10    NRBC   0.0 /100 WBC    Absolute nRBC   <0.01 k/uL <0.01    Diff Type   Auto               CK  REPORTED 2024 10:02  CK L 22 U/L            Comp Metabolic Panel  REPORTED 2024 10:02  Protein, Total   6.3 g/dL 6.3-8.0    Albumin L 3.2 g/dL 3.9-4.9    Calcium, Total   9.5 mg/dL 8.5-10.2    Bilirubin, Total   0.2 mg/dL 0.2-1.3    Alkaline Phosphatase H 131 U/L      AST H 62 U/L 13-35    ALT H 150 U/L 7-38    Glucose H 120 mg/dL 74-99  BUN L 6 mg/dL 7-21    Creatinine   0.65 mg/dL 0.58-0.96    Sodium   136 mmol/L 136-144    Potassium L 3.5 mmol/L 3.7-5.1    Chloride   100 mmol/L     CO2   24 mmol/L 22-30    Anion Gap   12 mmol/L 9-18    Estimated Glomerular Filtration Rate   95 mL/min/1.73 meters squared >=60    Assessment/Plan    Problem List Items Addressed This Visit       Back pain with radiculopathy     HYDROmorphone HCl Oral Tablet 2 MG po q 4 hours prn         Lumbar surgical wound fluid collection     Schedule follow up appointment with  Dr. Reyes- 02/19/24. Wound VAC cleanse spinal incision with NS apply wound vac at 125mmHg suction with black foam to wound bed, change 3x weekly and PRN if soiled or removed         Surgical wound infection - Primary     Daptomycin 420 mg IV daily. Tolterodine 1 mg po daily. Schedule follow up appointment with Dr. Reyna (ID) on 2/21/24, CBC w/diff, CMP, CK, and CRP weekly on Friday's         Meningitis     Rifampin 300 mg po bid, and Ceftriaxone 2 grams IV bid. Review labs.         Impaired functional mobility, balance, gait, and endurance     PT and OT to assess and treat.            Time:  I spent 45 minutes or greater with the patient. Greater than 50% of this time was spent in counseling and or coordination of care. The time includes prep time of reviewing vital signs, report from direct nursing staff and or therapists, hospital documentation, reviewing labs, radiographs, diagnostic tests and or consultations, time directly spent with the patient interviewing, examining, and education regarding diagnosis, treatments, and medications, as well as documentation in the electronic medical record, and reviewing the plan of care and any new orders with the patient, nursing staff and other staff directly related to the patients care.      Yoav Roman PA-C    [Well Developed] : well developed [No Respiratory Distress] : no respiratory distress  [No Accessory Muscle Use] : no accessory muscle use [Normal Rate] : normal rate  [Regular Rhythm] : with a regular rhythm [Normal S1, S2] : normal S1 and S2 [Pedal Pulses Present] : the pedal pulses are present [No Edema] : there was no peripheral edema [Alert and Oriented x3] : oriented to person, place, and time [de-identified] : ANXIOUS

## 2024-02-20 ENCOUNTER — APPOINTMENT (OUTPATIENT)
Dept: DERMATOLOGY | Facility: CLINIC | Age: 39
End: 2024-02-20

## 2024-02-21 ENCOUNTER — APPOINTMENT (OUTPATIENT)
Dept: NEPHROLOGY | Facility: CLINIC | Age: 39
End: 2024-02-21

## 2024-02-29 ENCOUNTER — APPOINTMENT (OUTPATIENT)
Dept: DERMATOLOGY | Facility: CLINIC | Age: 39
End: 2024-02-29
Payer: MEDICAID

## 2024-02-29 DIAGNOSIS — B07.9 VIRAL WART, UNSPECIFIED: ICD-10-CM

## 2024-02-29 DIAGNOSIS — L82.1 OTHER SEBORRHEIC KERATOSIS: ICD-10-CM

## 2024-02-29 DIAGNOSIS — L81.9 DISORDER OF PIGMENTATION, UNSPECIFIED: ICD-10-CM

## 2024-02-29 PROCEDURE — 99203 OFFICE O/P NEW LOW 30 MIN: CPT | Mod: 25

## 2024-02-29 PROCEDURE — 17110 DESTRUCTION B9 LES UP TO 14: CPT

## 2024-04-29 ENCOUNTER — APPOINTMENT (OUTPATIENT)
Dept: INTERNAL MEDICINE | Facility: CLINIC | Age: 39
End: 2024-04-29
Payer: MEDICAID

## 2024-04-29 DIAGNOSIS — L81.9 DISORDER OF PIGMENTATION, UNSPECIFIED: ICD-10-CM

## 2024-04-29 DIAGNOSIS — R79.89 OTHER SPECIFIED ABNORMAL FINDINGS OF BLOOD CHEMISTRY: ICD-10-CM

## 2024-04-29 PROCEDURE — 99213 OFFICE O/P EST LOW 20 MIN: CPT

## 2024-04-29 PROCEDURE — G2211 COMPLEX E/M VISIT ADD ON: CPT | Mod: NC,1L

## 2024-04-29 RX ORDER — TRIAMCINOLONE ACETONIDE 0.25 MG/G
0.03 CREAM TOPICAL 3 TIMES DAILY
Qty: 1 | Refills: 0 | Status: ACTIVE | COMMUNITY
Start: 2024-04-29 | End: 1900-01-01

## 2024-04-29 RX ORDER — MUPIROCIN 20 MG/G
2 OINTMENT TOPICAL
Qty: 1 | Refills: 0 | Status: ACTIVE | COMMUNITY
Start: 2024-04-29 | End: 1900-01-01

## 2024-04-29 NOTE — PLAN
[FreeTextEntry1] : hyperpigmented rash- tx w steroid- if not better- f/u w Derm rash on leg- try heat, mupirocin 20  minutes was spent caring for the patient today.  This includes time spent before the visit reviewing the chart, time spent during the visit,  time spent counseling, and educating the patient on the disease course and it's treatment/ management and as well as time spent after the visit on documentation.

## 2024-04-29 NOTE — PHYSICAL EXAM
[de-identified] : hyperpigmented, brown macular rash - inferior to L clavicle.  L thigh- reddish / purplish discoloration-? over hair follicle- annular in shape [TextEntry] : constitutional: no acute distress, well nourished, well developed and well appearing Pulmonary: no respiratory distress psychiatric: the affect was normal and insight and judgement was intact

## 2024-04-29 NOTE — HISTORY OF PRESENT ILLNESS
[Home] : at home, [unfilled] , at the time of the visit. [Medical Office: ([facility])___] : at the medical office located in  [Verbal consent obtained from patient] : the patient, [unfilled] [FreeTextEntry8] : CC: rash c/o hyperpigmentation brownish chest - just below L clavicle- for more than 3 months.  no itching.  no new creams, lotions, soaps c/o lesion on L thigh- noted for 3-4 days. states there was no pimple in that area.  no itching. medullary sponge kidney-pt never f/u w Nephrology dizziness- never f/u w Cardio

## 2024-05-04 ENCOUNTER — APPOINTMENT (OUTPATIENT)
Dept: INTERNAL MEDICINE | Facility: CLINIC | Age: 39
End: 2024-05-04
Payer: MEDICAID

## 2024-05-04 VITALS — DIASTOLIC BLOOD PRESSURE: 66 MMHG | SYSTOLIC BLOOD PRESSURE: 96 MMHG | HEART RATE: 85 BPM

## 2024-05-04 VITALS
OXYGEN SATURATION: 100 % | WEIGHT: 119 LBS | BODY MASS INDEX: 20.32 KG/M2 | DIASTOLIC BLOOD PRESSURE: 64 MMHG | HEIGHT: 64 IN | TEMPERATURE: 98 F | SYSTOLIC BLOOD PRESSURE: 100 MMHG | HEART RATE: 86 BPM

## 2024-05-04 DIAGNOSIS — R21 RASH AND OTHER NONSPECIFIC SKIN ERUPTION: ICD-10-CM

## 2024-05-04 PROCEDURE — 99214 OFFICE O/P EST MOD 30 MIN: CPT

## 2024-05-04 PROCEDURE — G2211 COMPLEX E/M VISIT ADD ON: CPT | Mod: NC,1L

## 2024-05-04 PROCEDURE — 93000 ELECTROCARDIOGRAM COMPLETE: CPT

## 2024-05-04 PROCEDURE — 36415 COLL VENOUS BLD VENIPUNCTURE: CPT

## 2024-05-04 NOTE — HISTORY OF PRESENT ILLNESS
[FreeTextEntry1] : dizziness, rash [de-identified] : vaccine- refused COVID vac. got COVID last yr.\par  diet- vegetarian\par  exercise- 3/ wk-treadmill, bike, wgt- no CP or SOB\par  dizziness- 3 yrs. since givingl birth to her son- lightheaded- when she stands- not every time. lightheaded duration a few min\par   chol- has improved her diet\par  random hives- last episode of hive- last wk\par  sees Ophth- for elev pressures\par  c/o pain - tailbone- if sitting for 1 hr. states area is tender on palpation- no fall \par  2 yrs-knee pain, wrist, L lat malleous, chin- moves to different jt - lasting a few min\par  elev amylase- in [place]-\par  heavy period- all her life.  has hx of fibroids. \par  3/11/19 EGD-nl\par  hair loss- frontal - 1 yr .  started after COVID.  no incr stressor [FreeTextEntry8] : c/o hyperpigmentation brownish chest - just below L clavicle- for more than 3 months. using steroid cream- 3 days c/o lesion on L thigh- resolved on its own medullary sponge kidney-pt never f/u w Nephrology UTI- went to UC 2 days ago and was given abt and sympt better.  dizziness- never f/u w Cardio.  4 yrs.  since giving birth to her son. occurs when sitting and suddenly getting up.  occurs- 3 /wk.  dizziness for a few sec. no CP , SOB, palpitations.  pt states she hydrates.

## 2024-05-04 NOTE — PLAN
[FreeTextEntry1] : not fasting labs- Blood was collected in the office. EKG- NSR 72 episodic lightheadedness- neg orthostatic hypotension and neg POTs, ck labs, hydration- if not better f/u w Cardio Medullary sponge kidney- pt has appt w Renal

## 2024-05-07 DIAGNOSIS — R73.09 OTHER ABNORMAL GLUCOSE: ICD-10-CM

## 2024-05-07 LAB
ALBUMIN SERPL ELPH-MCNC: 4.2 G/DL
ALP BLD-CCNC: 55 U/L
ALT SERPL-CCNC: 12 U/L
ANION GAP SERPL CALC-SCNC: 12 MMOL/L
AST SERPL-CCNC: 14 U/L
BASOPHILS # BLD AUTO: 0.03 K/UL
BASOPHILS NFR BLD AUTO: 0.6 %
BILIRUB SERPL-MCNC: 0.2 MG/DL
BUN SERPL-MCNC: 11 MG/DL
CALCIUM SERPL-MCNC: 9.4 MG/DL
CHLORIDE SERPL-SCNC: 103 MMOL/L
CO2 SERPL-SCNC: 24 MMOL/L
CREAT SERPL-MCNC: 0.51 MG/DL
EGFR: 122 ML/MIN/1.73M2
EOSINOPHIL # BLD AUTO: 0.1 K/UL
EOSINOPHIL NFR BLD AUTO: 1.9 %
FERRITIN SERPL-MCNC: 29 NG/ML
FOLATE SERPL-MCNC: 17.3 NG/ML
GLUCOSE SERPL-MCNC: 105 MG/DL
HCT VFR BLD CALC: 34.9 %
HGB BLD-MCNC: 11.3 G/DL
IMM GRANULOCYTES NFR BLD AUTO: 0.2 %
IRON SATN MFR SERPL: 10 %
IRON SERPL-MCNC: 31 UG/DL
LYMPHOCYTES # BLD AUTO: 1.42 K/UL
LYMPHOCYTES NFR BLD AUTO: 26.8 %
MAN DIFF?: NORMAL
MCHC RBC-ENTMCNC: 28.3 PG
MCHC RBC-ENTMCNC: 32.4 GM/DL
MCV RBC AUTO: 87.3 FL
MONOCYTES # BLD AUTO: 0.59 K/UL
MONOCYTES NFR BLD AUTO: 11.1 %
NEUTROPHILS # BLD AUTO: 3.15 K/UL
NEUTROPHILS NFR BLD AUTO: 59.4 %
PLATELET # BLD AUTO: 287 K/UL
POTASSIUM SERPL-SCNC: 4.6 MMOL/L
PROT SERPL-MCNC: 7.1 G/DL
RBC # BLD: 4 M/UL
RBC # FLD: 13.6 %
SODIUM SERPL-SCNC: 140 MMOL/L
T4 FREE SERPL-MCNC: 1.2 NG/DL
TIBC SERPL-MCNC: 315 UG/DL
TSH SERPL-ACNC: 2.76 UIU/ML
UIBC SERPL-MCNC: 285 UG/DL
VIT B12 SERPL-MCNC: 602 PG/ML
WBC # FLD AUTO: 5.3 K/UL

## 2024-06-18 ENCOUNTER — APPOINTMENT (OUTPATIENT)
Dept: NEPHROLOGY | Facility: CLINIC | Age: 39
End: 2024-06-18
Payer: MEDICAID

## 2024-06-18 VITALS
SYSTOLIC BLOOD PRESSURE: 98 MMHG | HEIGHT: 64 IN | TEMPERATURE: 97.6 F | WEIGHT: 115.74 LBS | DIASTOLIC BLOOD PRESSURE: 66 MMHG | OXYGEN SATURATION: 96 % | HEART RATE: 95 BPM | BODY MASS INDEX: 19.76 KG/M2

## 2024-06-18 DIAGNOSIS — U07.1 COVID-19: ICD-10-CM

## 2024-06-18 DIAGNOSIS — K21.9 GASTRO-ESOPHAGEAL REFLUX DISEASE W/OUT ESOPHAGITIS: ICD-10-CM

## 2024-06-18 DIAGNOSIS — R00.2 PALPITATIONS: ICD-10-CM

## 2024-06-18 DIAGNOSIS — Z83.438 FAMILY HISTORY OF OTHER DISORDER OF LIPOPROTEIN METABOLISM AND OTHER LIPIDEMIA: ICD-10-CM

## 2024-06-18 DIAGNOSIS — Z87.19 PERSONAL HISTORY OF OTHER DISEASES OF THE DIGESTIVE SYSTEM: ICD-10-CM

## 2024-06-18 DIAGNOSIS — Q61.5 MEDULLARY CYSTIC KIDNEY: ICD-10-CM

## 2024-06-18 DIAGNOSIS — Z83.79 FAMILY HISTORY OF OTHER DISEASES OF THE DIGESTIVE SYSTEM: ICD-10-CM

## 2024-06-18 DIAGNOSIS — Z82.3 FAMILY HISTORY OF STROKE: ICD-10-CM

## 2024-06-18 DIAGNOSIS — F41.9 ANXIETY DISORDER, UNSPECIFIED: ICD-10-CM

## 2024-06-18 DIAGNOSIS — F32.A ANXIETY DISORDER, UNSPECIFIED: ICD-10-CM

## 2024-06-18 DIAGNOSIS — Z82.49 FAMILY HISTORY OF ISCHEMIC HEART DISEASE AND OTHER DISEASES OF THE CIRCULATORY SYSTEM: ICD-10-CM

## 2024-06-18 DIAGNOSIS — K62.5 HEMORRHAGE OF ANUS AND RECTUM: ICD-10-CM

## 2024-06-18 DIAGNOSIS — Z83.3 FAMILY HISTORY OF DIABETES MELLITUS: ICD-10-CM

## 2024-06-18 PROCEDURE — 99203 OFFICE O/P NEW LOW 30 MIN: CPT

## 2024-06-18 RX ORDER — LORATADINE 5 MG/5 ML
10 SOLUTION, ORAL ORAL
Qty: 30 | Refills: 2 | Status: DISCONTINUED | COMMUNITY
Start: 2023-03-15 | End: 2024-06-18

## 2024-06-19 LAB
APPEARANCE: CLEAR
BACTERIA: NEGATIVE /HPF
BILIRUBIN URINE: NEGATIVE
BLOOD URINE: NEGATIVE
CAST: 0 /LPF
COLOR: YELLOW
CREAT SPEC-SCNC: 74 MG/DL
EPITHELIAL CELLS: 3 /HPF
GLUCOSE QUALITATIVE U: NEGATIVE MG/DL
KETONES URINE: NEGATIVE MG/DL
LEUKOCYTE ESTERASE URINE: ABNORMAL
MICROALBUMIN 24H UR DL<=1MG/L-MCNC: <1.2 MG/DL
MICROALBUMIN/CREAT 24H UR-RTO: NORMAL MG/G
MICROSCOPIC-UA: NORMAL
NITRITE URINE: NEGATIVE
PH URINE: 6.5
PROTEIN URINE: NEGATIVE MG/DL
RED BLOOD CELLS URINE: 2 /HPF
SPECIFIC GRAVITY URINE: 1.01
UROBILINOGEN URINE: 0.2 MG/DL
WHITE BLOOD CELLS URINE: 0 /HPF

## 2024-06-19 NOTE — HISTORY OF PRESENT ILLNESS
[FreeTextEntry1] : Ms Ricketts is a 37 y/o woman with h/o GERD here for evaluation of medullar nephrocalcinosis picked up on a sonogram  She was seen here several years ago. At that time she was asked to do a 24 hour urine collection which she never did. Since then there have been no interval stone formation/hematuria etc.  She still complains of heavy periods. Has not seen a gyn recently  GERD: diet controlled. Takes homeopathic meds as needed which seem to have helped   No recent infections/hospitalization  UTI a few months back, it was treated with antibiotics. Thinks its related to the sour drink she drank.     She as 3 kids now. All healthy. Works as a

## 2024-06-19 NOTE — REVIEW OF SYSTEMS
[As Noted in HPI] : as noted in HPI [Palpitations] : palpitations [Cough] : cough [Arthralgias] : arthralgias [Joint Stiffness] : joint stiffness [Negative] : Heme/Lymph [Heart Rate Is Slow] : the heart rate was not slow [Heart Rate Is Fast] : the heart rate was not fast [Shortness Of Breath] : no shortness of breath [Wheezing] : no wheezing [SOB on Exertion] : no shortness of breath during exertion [Orthopnea] : no orthopnea [PND] : no PND [Joint Swelling] : no joint swelling [Limb Pain] : no limb pain [Limb Swelling] : no limb swelling [FreeTextEntry5] : occasionally

## 2024-06-19 NOTE — ASSESSMENT
[FreeTextEntry1] : Ms Ricketts is a 39 y/o woman with h/o GERD here for evaluation of medullar nephrocalcinosis picked up on a sonogram  Medullary nephrocalcinosis: Possibly with medullary sponge kidney: the diagnosis can only be confirmed by IVP however given that its unlikely to , will hold of on it. Repeat   renal sonogram In the past she was evaluated for primary hyperparathyroidism, RTA,, none of which she has  Genetic testing today Check U/A and urine albumin/creatinine Provided her the script to do a 24 hour urine collection for litholink Reiterated that she drinks plenty of water Renal function is perfect ADDENUM: U/A noted. No blood or protein present d/w her    Total Time Spent today on encounter 40 minutes. >50% time spent in counseling and coordination of care and on addressing above medical conditions in assessment. All labs, imaging, consulting reports, and any relevant outside records including laboratory work personally reviewed in order to evaluate, manage, and coordinate care amongst providers

## 2024-06-19 NOTE — PHYSICAL EXAM
----- Message from Javad Mercedes MD sent at 3/19/2020  2:50 PM EDT -----  Since Mr. Cochran's cath showed no major blockages which required stenting but his ejection fraction was low  Would like to stop his amlodipine and start entresto 24/26mg BID.  Could we ask him to call us in a week with an update on how he is doing with the new medication and check a BMP one week after starting?  Thanks   [General Appearance - Alert] : alert [General Appearance - In No Acute Distress] : in no acute distress [Sclera] : the sclera and conjunctiva were normal [PERRL With Normal Accommodation] : pupils were equal in size, round, and reactive to light [Examination Of The Oral Cavity] : the lips and gums were normal [Oropharynx] : the oropharynx was normal [Neck Appearance] : the appearance of the neck was normal [Neck Cervical Mass (___cm)] : no neck mass was observed [Jugular Venous Distention Increased] : there was no jugular-venous distention [Respiration, Rhythm And Depth] : normal respiratory rhythm and effort [Exaggerated Use Of Accessory Muscles For Inspiration] : no accessory muscle use [Auscultation Breath Sounds / Voice Sounds] : lungs were clear to auscultation bilaterally [Chest Palpation] : palpation of the chest revealed no abnormalities [Lungs Percussion] : the lungs were normal to percussion [Heart Sounds] : normal S1 and S2 [Heart Sounds Gallop] : no gallops [Murmurs] : no murmurs [Heart Sounds Pericardial Friction Rub] : no pericardial rub [Edema] : there was no peripheral edema [Bowel Sounds] : normal bowel sounds [Abdomen Soft] : soft [Abdomen Tenderness] : non-tender [Abdomen Mass (___ Cm)] : no abdominal mass palpated [Abdomen Hernia] : no hernia was discovered [No CVA Tenderness] : no ~M costovertebral angle tenderness [Involuntary Movements] : no involuntary movements were seen [Skin Color & Pigmentation] : normal skin color and pigmentation [] : no rash [Skin Lesions] : no skin lesions [No Focal Deficits] : no focal deficits [Oriented To Time, Place, And Person] : oriented to person, place, and time [Impaired Insight] : insight and judgment were intact [Affect] : the affect was normal [FreeTextEntry1] : thin

## 2024-06-26 ENCOUNTER — APPOINTMENT (OUTPATIENT)
Dept: INTERNAL MEDICINE | Facility: CLINIC | Age: 39
End: 2024-06-26
Payer: MEDICAID

## 2024-06-26 ENCOUNTER — TRANSCRIPTION ENCOUNTER (OUTPATIENT)
Age: 39
End: 2024-06-26

## 2024-06-26 DIAGNOSIS — R42 DIZZINESS AND GIDDINESS: ICD-10-CM

## 2024-06-26 DIAGNOSIS — D64.9 ANEMIA, UNSPECIFIED: ICD-10-CM

## 2024-06-26 DIAGNOSIS — T75.20XA: ICD-10-CM

## 2024-06-26 PROCEDURE — G2211 COMPLEX E/M VISIT ADD ON: CPT | Mod: NC,1L

## 2024-06-26 PROCEDURE — 99212 OFFICE O/P EST SF 10 MIN: CPT

## 2024-07-17 ENCOUNTER — APPOINTMENT (OUTPATIENT)
Dept: INTERNAL MEDICINE | Facility: CLINIC | Age: 39
End: 2024-07-17
Payer: MEDICAID

## 2024-07-17 ENCOUNTER — LABORATORY RESULT (OUTPATIENT)
Age: 39
End: 2024-07-17

## 2024-07-17 VITALS
WEIGHT: 113 LBS | OXYGEN SATURATION: 99 % | BODY MASS INDEX: 19.29 KG/M2 | DIASTOLIC BLOOD PRESSURE: 61 MMHG | HEART RATE: 82 BPM | TEMPERATURE: 97.9 F | HEIGHT: 64 IN | SYSTOLIC BLOOD PRESSURE: 103 MMHG

## 2024-07-17 DIAGNOSIS — T75.20XA: ICD-10-CM

## 2024-07-17 DIAGNOSIS — D64.9 ANEMIA, UNSPECIFIED: ICD-10-CM

## 2024-07-17 DIAGNOSIS — Q61.5 MEDULLARY CYSTIC KIDNEY: ICD-10-CM

## 2024-07-17 DIAGNOSIS — R79.89 OTHER SPECIFIED ABNORMAL FINDINGS OF BLOOD CHEMISTRY: ICD-10-CM

## 2024-07-17 DIAGNOSIS — M54.9 DORSALGIA, UNSPECIFIED: ICD-10-CM

## 2024-07-17 DIAGNOSIS — R42 DIZZINESS AND GIDDINESS: ICD-10-CM

## 2024-07-17 PROCEDURE — G2211 COMPLEX E/M VISIT ADD ON: CPT | Mod: NC,1L

## 2024-07-17 PROCEDURE — 99214 OFFICE O/P EST MOD 30 MIN: CPT

## 2024-07-17 PROCEDURE — 36415 COLL VENOUS BLD VENIPUNCTURE: CPT

## 2024-07-19 LAB — 25(OH)D3 SERPL-MCNC: 35 NG/ML

## 2024-08-01 PROBLEM — Z33.2 MEDICAL ABORTION: Status: RESOLVED | Noted: 2019-07-04 | Resolved: 2024-08-01

## 2024-08-22 ENCOUNTER — APPOINTMENT (OUTPATIENT)
Dept: NEUROLOGY | Facility: CLINIC | Age: 39
End: 2024-08-22

## 2024-10-30 ENCOUNTER — APPOINTMENT (OUTPATIENT)
Dept: INTERNAL MEDICINE | Facility: CLINIC | Age: 39
End: 2024-10-30

## 2024-11-08 ENCOUNTER — APPOINTMENT (OUTPATIENT)
Dept: INTERNAL MEDICINE | Facility: CLINIC | Age: 39
End: 2024-11-08

## 2025-02-21 NOTE — ED ADULT NURSE NOTE - OBJECTIVE STATEMENT
Pt received a&ox3, c/o generalized abd pain and diarrhea x 2 days, pt seen here last week for UTI and given unknown antibiotic IV, pt denies urinary symptoms/vag bleed, LMP 3 weeks ago, pt appears to be in NAD, 20 gauge IV placed in right ac, labs drawn and sent, will continue to monitor.
You can access the FollowMyHealth Patient Portal offered by [facility] by registering at the following website: [email or web address]. By joining [facility]’s FollowMyHealth portal, you will also be able to view your health information using other applications (apps) compatible with our system.

## 2025-03-21 ENCOUNTER — APPOINTMENT (OUTPATIENT)
Dept: INTERNAL MEDICINE | Facility: CLINIC | Age: 40
End: 2025-03-21
Payer: MEDICAID

## 2025-03-21 VITALS
DIASTOLIC BLOOD PRESSURE: 63 MMHG | HEART RATE: 73 BPM | OXYGEN SATURATION: 100 % | SYSTOLIC BLOOD PRESSURE: 107 MMHG | WEIGHT: 114 LBS | BODY MASS INDEX: 19.46 KG/M2 | HEIGHT: 64 IN | TEMPERATURE: 97.8 F

## 2025-03-21 DIAGNOSIS — R79.89 OTHER SPECIFIED ABNORMAL FINDINGS OF BLOOD CHEMISTRY: ICD-10-CM

## 2025-03-21 DIAGNOSIS — M25.531 PAIN IN RIGHT WRIST: ICD-10-CM

## 2025-03-21 DIAGNOSIS — E78.5 HYPERLIPIDEMIA, UNSPECIFIED: ICD-10-CM

## 2025-03-21 DIAGNOSIS — M54.50 LOW BACK PAIN, UNSPECIFIED: ICD-10-CM

## 2025-03-21 DIAGNOSIS — D64.9 ANEMIA, UNSPECIFIED: ICD-10-CM

## 2025-03-21 DIAGNOSIS — Q61.5 MEDULLARY CYSTIC KIDNEY: ICD-10-CM

## 2025-03-21 PROCEDURE — 99214 OFFICE O/P EST MOD 30 MIN: CPT

## 2025-03-21 PROCEDURE — G2211 COMPLEX E/M VISIT ADD ON: CPT | Mod: NC

## 2025-03-21 PROCEDURE — 36415 COLL VENOUS BLD VENIPUNCTURE: CPT

## 2025-03-21 RX ORDER — DICLOFENAC SODIUM 3 G/100G
3 GEL TOPICAL TWICE DAILY
Qty: 1 | Refills: 6 | Status: ACTIVE | COMMUNITY
Start: 2025-03-21 | End: 1900-01-01

## 2025-03-22 LAB
25(OH)D3 SERPL-MCNC: 35.1 NG/ML
ANION GAP SERPL CALC-SCNC: 12 MMOL/L
BASOPHILS # BLD AUTO: 0.03 K/UL
BASOPHILS NFR BLD AUTO: 0.5 %
BUN SERPL-MCNC: 10 MG/DL
CALCIUM SERPL-MCNC: 9.1 MG/DL
CHLORIDE SERPL-SCNC: 101 MMOL/L
CHOLEST SERPL-MCNC: 180 MG/DL
CO2 SERPL-SCNC: 24 MMOL/L
CREAT SERPL-MCNC: 0.58 MG/DL
EGFRCR SERPLBLD CKD-EPI 2021: 118 ML/MIN/1.73M2
EOSINOPHIL # BLD AUTO: 0.08 K/UL
EOSINOPHIL NFR BLD AUTO: 1.5 %
FERRITIN SERPL-MCNC: 30 NG/ML
GLUCOSE SERPL-MCNC: 76 MG/DL
HCT VFR BLD CALC: 38.3 %
HDLC SERPL-MCNC: 51 MG/DL
HGB BLD-MCNC: 12.3 G/DL
IMM GRANULOCYTES NFR BLD AUTO: 0.2 %
IRON SATN MFR SERPL: 44 %
IRON SERPL-MCNC: 137 UG/DL
LDLC SERPL DIRECT ASSAY-MCNC: 125 MG/DL
LDLC SERPL-MCNC: 119 MG/DL
LYMPHOCYTES # BLD AUTO: 1.94 K/UL
LYMPHOCYTES NFR BLD AUTO: 35.3 %
MAN DIFF?: NORMAL
MCHC RBC-ENTMCNC: 29.3 PG
MCHC RBC-ENTMCNC: 32.1 G/DL
MCV RBC AUTO: 91.2 FL
MONOCYTES # BLD AUTO: 0.29 K/UL
MONOCYTES NFR BLD AUTO: 5.3 %
NEUTROPHILS # BLD AUTO: 3.14 K/UL
NEUTROPHILS NFR BLD AUTO: 57.2 %
NONHDLC SERPL-MCNC: 129 MG/DL
PLATELET # BLD AUTO: 299 K/UL
POTASSIUM SERPL-SCNC: 4 MMOL/L
RBC # BLD: 4.2 M/UL
RBC # FLD: 13 %
SODIUM SERPL-SCNC: 137 MMOL/L
TIBC SERPL-MCNC: 310 UG/DL
TRIGL SERPL-MCNC: 48 MG/DL
UIBC SERPL-MCNC: 173 UG/DL
VIT B12 SERPL-MCNC: 551 PG/ML
WBC # FLD AUTO: 5.49 K/UL

## 2025-03-26 LAB
HOMOCYSTEINE LEVEL: 7.9 UMOL/L
METHYLMALONATE SERPL-SCNC: 115 NMOL/L

## 2025-08-19 ENCOUNTER — APPOINTMENT (OUTPATIENT)
Dept: INTERNAL MEDICINE | Facility: CLINIC | Age: 40
End: 2025-08-19
Payer: MEDICAID

## 2025-08-19 VITALS
SYSTOLIC BLOOD PRESSURE: 110 MMHG | WEIGHT: 113 LBS | BODY MASS INDEX: 19.29 KG/M2 | DIASTOLIC BLOOD PRESSURE: 64 MMHG | HEIGHT: 64 IN | HEART RATE: 93 BPM | OXYGEN SATURATION: 99 %

## 2025-08-19 DIAGNOSIS — E78.5 HYPERLIPIDEMIA, UNSPECIFIED: ICD-10-CM

## 2025-08-19 DIAGNOSIS — M79.632 PAIN IN LEFT FOREARM: ICD-10-CM

## 2025-08-19 PROCEDURE — 99214 OFFICE O/P EST MOD 30 MIN: CPT

## 2025-08-25 ENCOUNTER — APPOINTMENT (OUTPATIENT)
Dept: ORTHOPEDIC SURGERY | Facility: CLINIC | Age: 40
End: 2025-08-25
Payer: MEDICAID

## 2025-08-25 ENCOUNTER — NON-APPOINTMENT (OUTPATIENT)
Age: 40
End: 2025-08-25

## 2025-08-25 VITALS — WEIGHT: 114 LBS | HEIGHT: 65 IN | BODY MASS INDEX: 18.99 KG/M2

## 2025-08-25 DIAGNOSIS — M54.50 LOW BACK PAIN, UNSPECIFIED: ICD-10-CM

## 2025-08-25 DIAGNOSIS — M47.817 SPONDYLOSIS W/OUT MYELOPATHY OR RADICULOPATHY, LUMBOSACRAL REGION: ICD-10-CM

## 2025-08-25 PROCEDURE — 99203 OFFICE O/P NEW LOW 30 MIN: CPT

## 2025-08-25 PROCEDURE — 72100 X-RAY EXAM L-S SPINE 2/3 VWS: CPT
